# Patient Record
Sex: MALE | Race: WHITE | NOT HISPANIC OR LATINO | Employment: FULL TIME | URBAN - METROPOLITAN AREA
[De-identification: names, ages, dates, MRNs, and addresses within clinical notes are randomized per-mention and may not be internally consistent; named-entity substitution may affect disease eponyms.]

---

## 2022-08-22 ENCOUNTER — OFFICE VISIT (OUTPATIENT)
Dept: URGENT CARE | Facility: CLINIC | Age: 52
End: 2022-08-22
Payer: COMMERCIAL

## 2022-08-22 DIAGNOSIS — M25.561 ACUTE PAIN OF RIGHT KNEE: Primary | ICD-10-CM

## 2022-08-22 PROCEDURE — 99203 OFFICE O/P NEW LOW 30 MIN: CPT | Performed by: PHYSICIAN ASSISTANT

## 2022-08-22 RX ORDER — PREDNISONE 20 MG/1
20 TABLET ORAL DAILY
Qty: 7 TABLET | Refills: 0 | Status: SHIPPED | OUTPATIENT
Start: 2022-08-22 | End: 2022-08-29

## 2022-08-22 RX ORDER — IBUPROFEN 600 MG/1
600 TABLET ORAL EVERY 6 HOURS PRN
Qty: 30 TABLET | Refills: 0 | Status: SHIPPED | OUTPATIENT
Start: 2022-08-22

## 2022-08-22 NOTE — PROGRESS NOTES
[facility]        NAME: Gary Beltre is a 49 y o  male  : 1970    MRN: [id number]  DATE: 2022  TIME: 12:23 PM    Assessment and Plan   Acute pain of right knee [M25 561]  1  Acute pain of right knee  ibuprofen (MOTRIN) 600 mg tablet    predniSONE 20 mg tablet    US MSK limited     - Ddx includes Baker's cyst vs effusion vs bursitis  - Rest, ice, compression, elevation  - Ibuprofen 600 and Prednisone sent x 1 week  - Schedule US of the right knee/popliteal fossa if symptoms do not resolve  - ER for worsening pain, swelling, fevers     Patient Instructions       Follow up with PCP in 3-5 days  Proceed to  ER if symptoms worsen  Chief Complaint     Chief Complaint   Patient presents with    Knee Pain         History of Present Illness       Patient is a 48 yo male who presents with a cc of discomfort in the posterior right knee x 4 days  States he feels a swollen area behind the knee as well  No trauma or injury  Symptoms worse with knee flexion especially and also somewhat with extension  Reports he is also not able to full flex/extend the knee  No fevers, chills, lower extremity edema  Review of Systems   Review of Systems   Musculoskeletal: Positive for arthralgias  Negative for gait problem and joint swelling  Right posterior knee pain   Skin: Negative for color change  Neurological: Negative for weakness and numbness           Current Medications       Current Outpatient Medications:     ibuprofen (MOTRIN) 600 mg tablet, Take 1 tablet (600 mg total) by mouth every 6 (six) hours as needed for mild pain, Disp: 30 tablet, Rfl: 0    predniSONE 20 mg tablet, Take 1 tablet (20 mg total) by mouth daily for 7 days, Disp: 7 tablet, Rfl: 0    Current Allergies     Allergies as of 2022    (Not on File)            The following portions of the patient's history were reviewed and updated as appropriate: allergies, current medications, past family history, past medical history, past social history, past surgical history and problem list      No past medical history on file  No past surgical history on file  No family history on file  Medications have been verified  Objective   There were no vitals taken for this visit  Physical Exam     Physical Exam  Constitutional:       General: He is not in acute distress  Cardiovascular:      Rate and Rhythm: Normal rate  Pulmonary:      Effort: Pulmonary effort is normal    Musculoskeletal:      Comments: Nontender nonwarm mass felt in right popliteal fossa most prominent with knee extension  Full flexion and extension somewhat limited due to pain/discomfort  No gait abnormality  Neurovascularly intact    Skin:     General: Skin is warm and dry  Neurological:      Mental Status: He is alert     Psychiatric:         Mood and Affect: Mood normal          Behavior: Behavior normal

## 2022-09-27 ENCOUNTER — APPOINTMENT (OUTPATIENT)
Dept: RADIOLOGY | Facility: CLINIC | Age: 52
End: 2022-09-27
Payer: COMMERCIAL

## 2022-09-27 ENCOUNTER — OFFICE VISIT (OUTPATIENT)
Dept: OBGYN CLINIC | Facility: CLINIC | Age: 52
End: 2022-09-27
Payer: COMMERCIAL

## 2022-09-27 VITALS
HEART RATE: 80 BPM | SYSTOLIC BLOOD PRESSURE: 110 MMHG | HEIGHT: 74 IN | RESPIRATION RATE: 18 BRPM | BODY MASS INDEX: 31.7 KG/M2 | OXYGEN SATURATION: 98 % | WEIGHT: 247 LBS | DIASTOLIC BLOOD PRESSURE: 76 MMHG

## 2022-09-27 DIAGNOSIS — M25.561 ACUTE PAIN OF RIGHT KNEE: ICD-10-CM

## 2022-09-27 DIAGNOSIS — M17.11 PRIMARY OSTEOARTHRITIS OF RIGHT KNEE: Primary | ICD-10-CM

## 2022-09-27 DIAGNOSIS — M71.21 BAKER'S CYST OF KNEE, RIGHT: ICD-10-CM

## 2022-09-27 PROCEDURE — 99204 OFFICE O/P NEW MOD 45 MIN: CPT | Performed by: FAMILY MEDICINE

## 2022-09-27 PROCEDURE — 73564 X-RAY EXAM KNEE 4 OR MORE: CPT

## 2022-09-27 RX ORDER — CHLORAL HYDRATE 500 MG
1000 CAPSULE ORAL DAILY
COMMUNITY

## 2022-09-27 RX ORDER — MULTIVITAMIN
1 CAPSULE ORAL DAILY
COMMUNITY

## 2022-09-27 RX ORDER — TESTOSTERONE 30 MG/1.5ML
SOLUTION TOPICAL
COMMUNITY
Start: 2022-07-02

## 2022-09-27 RX ORDER — ACETAMINOPHEN/DIPHENHYDRAMINE 500MG-25MG
81 TABLET ORAL DAILY
COMMUNITY
Start: 2022-07-06

## 2022-09-27 RX ORDER — LEVOTHYROXINE SODIUM 0.15 MG/1
150 TABLET ORAL DAILY
COMMUNITY
Start: 2022-07-07

## 2022-09-27 NOTE — PROGRESS NOTES
Subjective:    Chief Complaint   Patient presents with    Right Knee - Pain, Clicking       Phil Lynn is a 49 y o  male complains of right knee baker's cyst  Onset of the symptoms was several days ago  Mechanism of injury: none  Aggravating factors: none  Treatment to date: rest and elevation  Symptoms have essentially resolved  Patient reports no pain at current visit and reports the bakers cyst has completely resolved with rest, elevation and ibuprofen  The following portions of the patient's history were reviewed and updated as appropriate: allergies, current medications, past family history, past medical history, past social history, past surgical history and problem list     Occupation:  /    Review of Systems   Constitutional: Negative for fever  HENT: Negative for dental problem and headaches  Eyes: Negative for vision loss  Respiratory: Negative for cough and shortness of breath  Cardiovascular: Negative for leg swelling and palpitations  Gastrointestinal: Negative for constipation and diarrhea  Genitourinary: Negative for bladder incontinence and difficulty urinating  Musculoskeletal: Negative for back pain and difficulty walking  Skin: Negative for rash and ulcer  Neurological: Negative for dizziness and headaches  Hem/Lymph/Immuno: Negative for blood clots  Does not bruise/bleed easily  Psychiatric/Behavioral: Negative for confusion  Objective:  /76   Pulse 80   Resp 18   Ht 6' 2" (1 88 m)   Wt 112 kg (247 lb)   SpO2 98%   BMI 31 71 kg/m²   Skin: no rashes, lesions, skin discolorations, lacerations  Vasculature: normal popliteal and pedal pulse, normal skin color, normal capillary refill in extremity, no lower extremity edema  Neurologic: Neurologic exam is normal throughout lower extremities, Awake, alert, and oriented x3, no apparent distress      Musculoskeletal: Right  KNEE EXAM  Gait: limping gait negative  Inspection:No erythema, no induration  There is no gross deformity  Palpation: Swelling: negative  Effusion: negative  Medial joint line TTP: negative  Lateral joint line TTP: negative  ROM: Full flexion and extension  Special tests: Barbara's: negative, Apley's compression: negative  Instability to varus/valgus stress: negative  Anterior Drawer: negative Lachman's test: negative  Posterior Drawer: negative  Crepitus: negative        Imaging:  See final report     Assessment/Plan:    1  Acute pain of right knee  2  Primary osteoarthritis of right knee  3  Baker's cyst of knee, right      >45min devoted to review of previous, pertinent medical records, imaging, discussion of treatment options, counseling and documentation  Imaging independently reviewed and discussed with patient  Degenerative changes noted, no acute fractures appreciated  Follow-up official reading  We discussed the nature of knee OA /Bakers cyst at length and detailed the treatment approach  Patient is asymptomatic at this time and has resolution of the Baker cyst   Discussed role for corticosteroid injection if Baker cyst returns-patient will return if symptoms recur  Follow up in as needed   Should sx's worsen or any concerns arise, they were advised to follow up sooner or seek more immediate medical attention  All of the patient's concerns were addressed and questions answered  They verbalized agreement with and understanding of the treatment plan

## 2023-01-12 ENCOUNTER — OFFICE VISIT (OUTPATIENT)
Dept: FAMILY MEDICINE CLINIC | Facility: CLINIC | Age: 53
End: 2023-01-12

## 2023-01-12 ENCOUNTER — APPOINTMENT (OUTPATIENT)
Dept: LAB | Facility: CLINIC | Age: 53
End: 2023-01-12

## 2023-01-12 VITALS
DIASTOLIC BLOOD PRESSURE: 68 MMHG | SYSTOLIC BLOOD PRESSURE: 100 MMHG | BODY MASS INDEX: 32.19 KG/M2 | HEART RATE: 93 BPM | WEIGHT: 250.8 LBS | HEIGHT: 74 IN | OXYGEN SATURATION: 96 % | TEMPERATURE: 98.1 F | RESPIRATION RATE: 18 BRPM

## 2023-01-12 DIAGNOSIS — Z11.4 ENCOUNTER FOR SCREENING FOR HIV: ICD-10-CM

## 2023-01-12 DIAGNOSIS — Z13.220 LIPID SCREENING: ICD-10-CM

## 2023-01-12 DIAGNOSIS — M77.9 INFLAMMATION AROUND JOINT: ICD-10-CM

## 2023-01-12 DIAGNOSIS — Z12.11 SCREENING FOR COLON CANCER: ICD-10-CM

## 2023-01-12 DIAGNOSIS — Z00.00 ANNUAL PHYSICAL EXAM: Primary | ICD-10-CM

## 2023-01-12 DIAGNOSIS — E23.0 HYPOPITUITARISM (HCC): ICD-10-CM

## 2023-01-12 DIAGNOSIS — E55.9 VITAMIN D DEFICIENCY: ICD-10-CM

## 2023-01-12 DIAGNOSIS — I31.39 PERICARDIAL EFFUSION: ICD-10-CM

## 2023-01-12 DIAGNOSIS — Z13.1 DIABETES MELLITUS SCREENING: ICD-10-CM

## 2023-01-12 DIAGNOSIS — E03.9 HYPOTHYROIDISM, UNSPECIFIED TYPE: ICD-10-CM

## 2023-01-12 DIAGNOSIS — E34.9 TESTOSTERONE DEFICIENCY: ICD-10-CM

## 2023-01-12 DIAGNOSIS — Z11.59 NEED FOR HEPATITIS C SCREENING TEST: ICD-10-CM

## 2023-01-12 PROBLEM — C85.90 LYMPHOMA (HCC): Status: ACTIVE | Noted: 2023-01-12

## 2023-01-12 LAB
25(OH)D3 SERPL-MCNC: 33.5 NG/ML (ref 30–100)
ALBUMIN SERPL BCP-MCNC: 4.4 G/DL (ref 3.5–5)
ALP SERPL-CCNC: 87 U/L (ref 46–116)
ALT SERPL W P-5'-P-CCNC: 58 U/L (ref 12–78)
ANION GAP SERPL CALCULATED.3IONS-SCNC: 6 MMOL/L (ref 4–13)
AST SERPL W P-5'-P-CCNC: 37 U/L (ref 5–45)
BILIRUB SERPL-MCNC: 0.53 MG/DL (ref 0.2–1)
BUN SERPL-MCNC: 18 MG/DL (ref 5–25)
CALCIUM SERPL-MCNC: 9.5 MG/DL (ref 8.3–10.1)
CHLORIDE SERPL-SCNC: 109 MMOL/L (ref 96–108)
CHOLEST SERPL-MCNC: 255 MG/DL
CO2 SERPL-SCNC: 23 MMOL/L (ref 21–32)
CREAT SERPL-MCNC: 0.98 MG/DL (ref 0.6–1.3)
ERYTHROCYTE [DISTWIDTH] IN BLOOD BY AUTOMATED COUNT: 14.2 % (ref 11.6–15.1)
EST. AVERAGE GLUCOSE BLD GHB EST-MCNC: 131 MG/DL
FERRITIN SERPL-MCNC: 481 NG/ML (ref 8–388)
GFR SERPL CREATININE-BSD FRML MDRD: 88 ML/MIN/1.73SQ M
GLUCOSE P FAST SERPL-MCNC: 139 MG/DL (ref 65–99)
HBA1C MFR BLD: 6.2 %
HCT VFR BLD AUTO: 47.1 % (ref 36.5–49.3)
HDLC SERPL-MCNC: 45 MG/DL
HGB BLD-MCNC: 15.8 G/DL (ref 12–17)
LDLC SERPL CALC-MCNC: 164 MG/DL (ref 0–100)
MCH RBC QN AUTO: 32.2 PG (ref 26.8–34.3)
MCHC RBC AUTO-ENTMCNC: 33.5 G/DL (ref 31.4–37.4)
MCV RBC AUTO: 96 FL (ref 82–98)
NONHDLC SERPL-MCNC: 210 MG/DL
PLATELET # BLD AUTO: 408 THOUSANDS/UL (ref 149–390)
PMV BLD AUTO: 9.5 FL (ref 8.9–12.7)
POTASSIUM SERPL-SCNC: 4.4 MMOL/L (ref 3.5–5.3)
PROT SERPL-MCNC: 8.1 G/DL (ref 6.4–8.4)
RBC # BLD AUTO: 4.9 MILLION/UL (ref 3.88–5.62)
SODIUM SERPL-SCNC: 138 MMOL/L (ref 135–147)
T3FREE SERPL-MCNC: 3.01 PG/ML (ref 2.3–4.2)
TRIGL SERPL-MCNC: 229 MG/DL
TSH SERPL DL<=0.05 MIU/L-ACNC: 2.08 UIU/ML (ref 0.45–4.5)
WBC # BLD AUTO: 9.32 THOUSAND/UL (ref 4.31–10.16)

## 2023-01-12 NOTE — ASSESSMENT & PLAN NOTE
Is a pleasant 56-year-old male with past medical history of lymphoma status post chemo and radiation, viral pericarditis, cataracts and hypothyroidism    Patient reports colonoscopy in November 2021, pending records    Routine blood work has been ordered, will follow-up pending results

## 2023-01-12 NOTE — ASSESSMENT & PLAN NOTE
Currently asymptomatic, patient no longer taking colchicine or on any steroids    Ibuprofen as needed

## 2023-01-12 NOTE — PATIENT INSTRUCTIONS

## 2023-01-12 NOTE — PROGRESS NOTES
ADULT ANNUAL [facility] PRIMARY CARE    NAME: Asha Pricesins  AGE: 47 y o  SEX: male  : 1970     DATE: 2023     Assessment and Plan:     Problem List Items Addressed This Visit        Endocrine    Hypothyroidism     Check TSH level, patient to continue levothyroxine 150 mcg         Relevant Orders    CBC and Platelet    TSH, 3rd generation with Free T4 reflex    T3, free    Hypopituitarism (HCC)     S/P radiation for lymphoma  On chronic testosterone therapy            Cardiovascular and Mediastinum    Pericardial effusion     Currently asymptomatic, patient no longer taking colchicine or on any steroids  Ibuprofen as needed            Other    Annual physical exam - Primary     Is a pleasant 46-year-old male with past medical history of lymphoma status post chemo and radiation, viral pericarditis, cataracts and hypothyroidism    Patient reports colonoscopy in 2021, pending records    Routine blood work has been ordered, will follow-up pending results  Other Visit Diagnoses     Screening for colon cancer        Lipid screening        Relevant Orders    Lipid panel    Diabetes mellitus screening        Relevant Orders    Hemoglobin A1C    Comprehensive metabolic panel    Inflammation around joint        Relevant Orders    Ferritin    Anti-dsDNA (Double-stranded) Ab by Ranelle Citizen method (RDL)    VALENTINE Screen w/ Reflex to Titer/Pattern    Lyme Antibody Profile with reflex to WB    Testosterone deficiency        Relevant Orders    Testosterone, free, total    Vitamin D deficiency        Relevant Orders    Vitamin D 25 hydroxy    Encounter for screening for HIV        Relevant Orders    : HIV 1/2 AB/AG w Reflex [facility] for 2 yr old and above    Need for hepatitis C screening test        Relevant Orders    Hepatitis C antibody          Immunizations and preventive care screenings were discussed with patient today   Appropriate education was printed on patient's after visit summary  Counseling:  Alcohol/drug use: discussed moderation in alcohol intake, the recommendations for healthy alcohol use, and avoidance of illicit drug use  Dental Health: discussed importance of regular tooth brushing, flossing, and dental visits  · Exercise: the importance of regular exercise/physical activity was discussed  Recommend exercise 3-5 times per week for at least 30 minutes  BMI Counseling: Body mass index is 32 2 kg/m²  The BMI is above normal  Nutrition recommendations include encouraging healthy choices of fruits and vegetables  Rationale for BMI follow-up plan is due to patient being overweight or obese  Patient ideally would like to transition back to a keto diet ending blood work  Depression Screening and Follow-up Plan: Patient was screened for depression during today's encounter  They screened negative with a PHQ-2 score of 0  Return in about 13 months (around 1/31/2024) for PreOp Clearance  Chief Complaint:     Chief Complaint   Patient presents with   • Establish Care     Patient is here today to establish care with new PCP  • Medication Refill      History of Present Illness:     Adult Annual Physical   Patient here for a comprehensive physical exam  The patient reports no problems  Diet and Physical Activity  · Diet/Nutrition: Used to be on Keto diet, would like to go back to it  · Exercise: no formal exercise  Depression Screening  PHQ-2/9 Depression Screening    Little interest or pleasure in doing things: 0 - not at all  Feeling down, depressed, or hopeless: 0 - not at all  PHQ-2 Score: 0  PHQ-2 Interpretation: Negative depression screen       General Health  · Sleep: snores loudly  · Hearing: normal - bilateral   · Vision: most recent eye exam <1 year ago and wears glasses  · Dental: regular dental visits          Health  · Symptoms include: none      · Lymphoma in 1996 s/p Chemo and Radiation , In remission in 1999  · Dad had HTN, Triple bypass, DM, ESRD  · Mom Brain Cancer, CAD with stents, DM  · Sister has Lupus  · Brothers and other sister DM    Does not drink alcohol  Previous smoker Quit 07/2010, 1-2 packs per day        Colonoscopy 11/2021- In [place]    Hx of pericarditis 3-4 years ago- Viral pericarditis  Previous [facility]      Review of Systems:     Review of Systems   Respiratory: Negative for cough and shortness of breath  Cardiovascular: Negative for chest pain and palpitations  Gastrointestinal: Negative for constipation, diarrhea, nausea and vomiting  Genitourinary: Negative for dysuria  Musculoskeletal: Positive for back pain  Neurological: Negative for dizziness and headaches        Past Medical History:     Past Medical History:   Diagnosis Date   • Cancer ([facility] )    • Diabetes mellitus ([facility] )    • Disease of thyroid gland       Past Surgical History:     Past Surgical History:   Procedure Laterality Date   • HERNIA REPAIR     • LIVER SURGERY     • SPLENECTOMY, PARTIAL     • TONSILLECTOMY        Family History:     Family History   Problem Relation Age of Onset   • Diabetes Mother    • Cancer Mother    • Heart disease Mother    • Heart disease Father    • Diabetes Father    • Cancer Father    • Lupus Sister       Social History:     Social History     Socioeconomic History   • Marital status: /Civil Union     Spouse name: None   • Number of children: None   • Years of education: None   • Highest education level: None   Occupational History   • None   Tobacco Use   • Smoking status: Former     Types: Cigarettes   • Smokeless tobacco: Never   Vaping Use   • Vaping Use: Never used   Substance and Sexual Activity   • Alcohol use: Not Currently   • Drug use: Not Currently   • Sexual activity: None   Other Topics Concern   • None   Social History Narrative   • None     Social Determinants of Health     Financial Resource Strain: Not on file   Food Insecurity: Not on file   Transportation Needs: Not on file   Physical Activity: Not on file   Stress: Not on file   Social Connections: Not on file   Intimate Partner Violence: Not on file   Housing Stability: Not on file      Current Medications:     Current Outpatient Medications   Medication Sig Dispense Refill   • ibuprofen (MOTRIN) 600 mg tablet Take 1 tablet (600 mg total) by mouth every 6 (six) hours as needed for mild pain 30 tablet 0   • levothyroxine 150 mcg tablet Take 150 mcg by mouth daily     • Multiple Vitamin (multivitamin) capsule Take 1 capsule by mouth daily     • Omega-3 Fatty Acids (fish oil) 1,000 mg Take 1,000 mg by mouth daily     • RA Aspirin EC 81 MG EC tablet Take 81 mg by mouth daily     • Testosterone 30 MG/ACT SOLN apply 1 APPLICATION topically daily       No current facility-administered medications for this visit  Allergies: Allergies   Allergen Reactions   • Other Nasal Congestion     Mustard   • Keflex [Cephalexin] Rash      Physical Exam:     /68   Pulse 93   Temp 98 1 °F (36 7 °C) (Temporal)   Resp 18   Ht 6' 2" (1 88 m)   Wt 114 kg (250 lb 12 8 oz)   SpO2 96%   BMI 32 20 kg/m²     Physical Exam  Vitals reviewed  Constitutional:       Appearance: He is obese  HENT:      Head: Normocephalic and atraumatic  Right Ear: Tympanic membrane normal       Left Ear: Tympanic membrane normal    Eyes:      Extraocular Movements: Extraocular movements intact  Cardiovascular:      Rate and Rhythm: Normal rate and regular rhythm  Heart sounds: Normal heart sounds  Pulmonary:      Effort: Pulmonary effort is normal       Breath sounds: Normal breath sounds  Musculoskeletal:      Cervical back: Neck supple  No tenderness  Lymphadenopathy:      Cervical: No cervical adenopathy  Skin:     Capillary Refill: Capillary refill takes less than 2 seconds  Neurological:      General: No focal deficit present        Mental Status: He is alert and oriented to person, place, and time     Psychiatric:         Mood and Affect: Mood normal          Behavior: Behavior normal           Jamee Bell DO  [address]

## 2023-01-13 LAB
ANA SER QL IA: NEGATIVE
B BURGDOR IGG+IGM SER-ACNC: 0.4 AI
DSDNA AB SER-ACNC: <1 IU/ML (ref 0–9)
HCV AB SER QL: NORMAL
HIV 1+2 AB+HIV1 P24 AG SERPL QL IA: NORMAL
HIV 2 AB SERPL QL IA: NORMAL
HIV1 AB SERPL QL IA: NORMAL
HIV1 P24 AG SERPL QL IA: NORMAL
TESTOST FREE SERPL-MCNC: 4.7 PG/ML (ref 7.2–24)
TESTOST SERPL-MCNC: 89 NG/DL (ref 264–916)

## 2023-01-17 ENCOUNTER — TELEPHONE (OUTPATIENT)
Dept: ENDOCRINOLOGY | Facility: CLINIC | Age: 53
End: 2023-01-17

## 2023-01-17 ENCOUNTER — OFFICE VISIT (OUTPATIENT)
Dept: FAMILY MEDICINE CLINIC | Facility: CLINIC | Age: 53
End: 2023-01-17

## 2023-01-17 VITALS
BODY MASS INDEX: 31.49 KG/M2 | OXYGEN SATURATION: 95 % | WEIGHT: 245.38 LBS | HEIGHT: 74 IN | SYSTOLIC BLOOD PRESSURE: 116 MMHG | TEMPERATURE: 97.9 F | HEART RATE: 97 BPM | DIASTOLIC BLOOD PRESSURE: 66 MMHG

## 2023-01-17 DIAGNOSIS — E23.0 HYPOPITUITARISM (HCC): ICD-10-CM

## 2023-01-17 DIAGNOSIS — E03.9 HYPOTHYROIDISM, UNSPECIFIED TYPE: ICD-10-CM

## 2023-01-17 DIAGNOSIS — R79.89 ELEVATED FERRITIN LEVEL: ICD-10-CM

## 2023-01-17 DIAGNOSIS — E34.9 TESTOSTERONE DEFICIENCY: ICD-10-CM

## 2023-01-17 DIAGNOSIS — R73.03 PREDIABETES: Primary | ICD-10-CM

## 2023-01-17 DIAGNOSIS — C81.91 HODGKIN LYMPHOMA OF LYMPH NODES OF NECK, UNSPECIFIED HODGKIN LYMPHOMA TYPE (HCC): ICD-10-CM

## 2023-01-17 PROBLEM — E11.9 TYPE 2 DIABETES MELLITUS WITHOUT COMPLICATION, WITHOUT LONG-TERM CURRENT USE OF INSULIN (HCC): Status: ACTIVE | Noted: 2023-01-17

## 2023-01-17 RX ORDER — LEVOTHYROXINE SODIUM 0.15 MG/1
150 TABLET ORAL DAILY
Qty: 90 TABLET | Refills: 3 | Status: SHIPPED | OUTPATIENT
Start: 2023-01-17

## 2023-01-17 RX ORDER — TESTOSTERONE 30 MG/1.5ML
30 SOLUTION TOPICAL DAILY
Qty: 90 ML | Refills: 3 | Status: SHIPPED | OUTPATIENT
Start: 2023-01-17

## 2023-01-17 RX ORDER — ACETAMINOPHEN/DIPHENHYDRAMINE 500MG-25MG
81 TABLET ORAL DAILY
Qty: 30 TABLET | Refills: 1 | Status: SHIPPED | OUTPATIENT
Start: 2023-01-17

## 2023-01-17 NOTE — ASSESSMENT & PLAN NOTE
Hgb A1c 6 2, patient reports taking metformin in the past but discontinued once started controlled diet

## 2023-01-17 NOTE — PROGRESS NOTES
Name: Laureen Bowling      : 1970      MRN: [id number]  Encounter Provider: Fei Dunlap DO  Encounter Date: 2023   Encounter department: [address] [place] [address]     1  Prediabetes  Assessment & Plan:  Hgb A1c 6 2, patient reports taking metformin in the past but discontinued once started controlled diet  2  Hodgkin lymphoma of lymph nodes of neck, unspecified Hodgkin lymphoma type ([facility] [address] )    3  Hypothyroidism, unspecified type  Assessment & Plan:  Continue levothyroxine 150 mcg  Referral to endocrinology has been given    Orders:  -     Ambulatory Referral to Endocrinology; Future  -     levothyroxine 150 mcg tablet; Take 1 tablet (150 mcg total) by mouth daily    4  Hypopituitarism [facility])  -     Ambulatory Referral to Endocrinology; Future    5  Testosterone deficiency  Assessment & Plan: Will refill testosterone supplementation at this time, referral has been given to endocrinology for further management  Orders:  -     Ambulatory Referral to Endocrinology; Future  -     Testosterone, free, total; Future  -     Testosterone 30 MG/ACT SOLN; Place 1 Act (30 mg total) on the skin in the morning    6  Elevated ferritin level  Assessment & Plan:  Decreased from last check 760 now to 481  Recheck levels in 6 weeks    Orders:  -     Ferritin; Future  -     RA Aspirin EC 81 MG EC tablet; Take 1 tablet (81 mg total) by mouth daily         Subjective      Presents today to review recent blood work results  She has lost 5 pounds since last visit, currently has restarted keto diet  Also reports that he has restarted testosterone supplementation  Review of Systems   Constitutional: Negative for chills and fever  Respiratory: Negative for cough and shortness of breath  Cardiovascular: Negative for chest pain and palpitations  Gastrointestinal: Negative for constipation, diarrhea, nausea and vomiting     Neurological: Negative for dizziness and headaches  Current Outpatient Medications on File Prior to Visit   Medication Sig   • Multiple Vitamin (multivitamin) capsule Take 1 capsule by mouth daily   • Omega-3 Fatty Acids (fish oil) 1,000 mg Take 1,000 mg by mouth daily   • [DISCONTINUED] levothyroxine 150 mcg tablet Take 150 mcg by mouth daily   • [DISCONTINUED] RA Aspirin EC 81 MG EC tablet Take 81 mg by mouth daily   • [DISCONTINUED] Testosterone 30 MG/ACT SOLN apply 1 APPLICATION topically daily   • ibuprofen (MOTRIN) 600 mg tablet Take 1 tablet (600 mg total) by mouth every 6 (six) hours as needed for mild pain       Objective     /66 (BP Location: Left arm, Patient Position: Sitting, Cuff Size: Large)   Pulse 97   Temp 97 9 °F (36 6 °C) (Temporal)   Ht 6' 2" (1 88 m)   Wt 111 kg (245 lb 6 oz)   SpO2 95%   BMI 31 50 kg/m²     Physical Exam  Vitals reviewed  Constitutional:       Appearance: Normal appearance  HENT:      Head: Normocephalic and atraumatic  Eyes:      Extraocular Movements: Extraocular movements intact  Cardiovascular:      Rate and Rhythm: Normal rate and regular rhythm  Heart sounds: Normal heart sounds  Pulmonary:      Effort: Pulmonary effort is normal       Breath sounds: Normal breath sounds  Neurological:      Mental Status: He is alert and oriented to person, place, and time     Psychiatric:         Mood and Affect: Mood normal          Behavior: Behavior normal        Ilya Duarte DO

## 2023-01-17 NOTE — ASSESSMENT & PLAN NOTE
Will refill testosterone supplementation at this time, referral has been given to endocrinology for further management

## 2023-02-02 ENCOUNTER — APPOINTMENT (OUTPATIENT)
Dept: LAB | Facility: CLINIC | Age: 53
End: 2023-02-02

## 2023-02-02 DIAGNOSIS — E34.9 TESTOSTERONE DEFICIENCY: ICD-10-CM

## 2023-02-02 DIAGNOSIS — R79.89 ELEVATED FERRITIN LEVEL: ICD-10-CM

## 2023-02-02 LAB — FERRITIN SERPL-MCNC: 466 NG/ML (ref 8–388)

## 2023-02-03 LAB
TESTOST FREE SERPL-MCNC: 11.9 PG/ML (ref 7.2–24)
TESTOST SERPL-MCNC: 228 NG/DL (ref 264–916)

## 2023-02-06 ENCOUNTER — OFFICE VISIT (OUTPATIENT)
Dept: FAMILY MEDICINE CLINIC | Facility: CLINIC | Age: 53
End: 2023-02-06

## 2023-02-06 VITALS
WEIGHT: 244.38 LBS | SYSTOLIC BLOOD PRESSURE: 102 MMHG | BODY MASS INDEX: 31.36 KG/M2 | OXYGEN SATURATION: 95 % | TEMPERATURE: 97.8 F | DIASTOLIC BLOOD PRESSURE: 70 MMHG | HEIGHT: 74 IN | HEART RATE: 95 BPM

## 2023-02-06 DIAGNOSIS — E34.9 TESTOSTERONE DEFICIENCY: ICD-10-CM

## 2023-02-06 DIAGNOSIS — R79.89 ELEVATED FERRITIN LEVEL: ICD-10-CM

## 2023-02-06 DIAGNOSIS — Z01.818 PREOPERATIVE CLEARANCE: Primary | ICD-10-CM

## 2023-02-06 PROBLEM — E11.9 TYPE 2 DIABETES MELLITUS WITHOUT COMPLICATION, WITHOUT LONG-TERM CURRENT USE OF INSULIN (HCC): Status: ACTIVE | Noted: 2023-02-06

## 2023-02-06 PROBLEM — E11.9 TYPE 2 DIABETES MELLITUS WITHOUT COMPLICATION, WITHOUT LONG-TERM CURRENT USE OF INSULIN (HCC): Status: RESOLVED | Noted: 2023-02-06 | Resolved: 2023-02-06

## 2023-02-06 RX ORDER — ACETAMINOPHEN/DIPHENHYDRAMINE 500MG-25MG
81 TABLET ORAL DAILY
Qty: 30 TABLET | Refills: 1 | Status: SHIPPED | OUTPATIENT
Start: 2023-02-06

## 2023-02-06 NOTE — PROGRESS NOTES
Subjective:     Amanuel Pandya is a 47 y o  male who presents to the office today for a preoperative consultation at the request of surgeon Dr Jennifer Momin who plans on performing cataract surgery with lens implant on February 28  Planned anesthesia: local  The patient has the following known anesthesia issues: None  Patients bleeding risk: no remote history of abnormal bleeding  The following portions of the patient's history were reviewed and updated as appropriate: allergies, current medications, past family history, past medical history, past social history, past surgical history and problem list     Review of Systems  Pertinent items are noted in HPI       Objective:     /70 (BP Location: Right arm, Patient Position: Sitting, Cuff Size: Large)   Pulse 95   Temp 97 8 °F (36 6 °C) (Temporal)   Ht 6' 2" (1 88 m)   Wt 111 kg (244 lb 6 oz)   SpO2 95%   BMI 31 38 kg/m²     General Appearance:    Alert, cooperative, no distress, appears stated age   Head:    Normocephalic, without obvious abnormality, atraumatic   Eyes:    PERRL, conjunctiva/corneas clear, EOM's intact, fundi     benign, both eyes        Ears:    Normal TM's and external ear canals, both ears   Nose:   Nares normal, septum midline, mucosa normal, no drainage    or sinus tenderness   Throat:   Lips, mucosa, and tongue normal; teeth and gums normal   Neck:   Supple, symmetrical, trachea midline, no adenopathy;        thyroid:  No enlargement/tenderness/nodules; no carotid    bruit or JVD   Back:     Symmetric, no curvature, ROM normal, no CVA tenderness   Lungs:     Clear to auscultation bilaterally, respirations unlabored   Chest wall:    No tenderness or deformity   Heart:    Regular rate and rhythm, S1 and S2 normal, no murmur, rub   or gallop   Abdomen:     Soft, non-tender, bowel sounds active all four quadrants,     no masses, no organomegaly   Genitalia:    Normal male without lesion, discharge or tenderness   Rectal:    Normal tone, normal prostate, no masses or tenderness;    guaiac negative stool   Extremities:   Extremities normal, atraumatic, no cyanosis or edema   Pulses:   2+ and symmetric all extremities   Skin:   Skin color, texture, turgor normal, no rashes or lesions   Lymph nodes:   Cervical, supraclavicular, and axillary nodes normal   Neurologic:   CNII-XII intact  Normal strength, sensation and reflexes       throughout       Predictors of intubation difficulty:   Morbid obesity? no   Anatomically abnormal facies?  no   Prominent incisors? no   Receding mandible? no   Short, thick neck? no   Neck range of motion: normal    Cardiographics  Not indicated    Imaging  Not indicated    Lab Review   Appointment on 02/02/2023   Component Date Value   • Ferritin 02/02/2023 466 (H)    • Testosterone, Free 02/02/2023 11 9    • TESTOSTERONE TOTAL 02/02/2023 228 (L)    Appointment on 01/12/2023   Component Date Value   • Cholesterol 01/12/2023 255 (H)    • Triglycerides 01/12/2023 229 (H)    • HDL, Direct 01/12/2023 45    • LDL Calculated 01/12/2023 164 (H)    • Non-HDL-Chol (CHOL-HDL) 01/12/2023 210    • Hemoglobin A1C 01/12/2023 6 2 (H)    • EAG 01/12/2023 131    • WBC 01/12/2023 9 32    • RBC 01/12/2023 4 90    • Hemoglobin 01/12/2023 15 8    • Hematocrit 01/12/2023 47 1    • MCV 01/12/2023 96    • MCH 01/12/2023 32 2    • MCHC 01/12/2023 33 5    • RDW 01/12/2023 14 2    • Platelets 83/19/8170 408 (H)    • MPV 01/12/2023 9 5    • Sodium 01/12/2023 138    • Potassium 01/12/2023 4 4    • Chloride 01/12/2023 109 (H)    • CO2 01/12/2023 23    • ANION GAP 01/12/2023 6    • BUN 01/12/2023 18    • Creatinine 01/12/2023 0 98    • Glucose, Fasting 01/12/2023 139 (H)    • Calcium 01/12/2023 9 5    • AST 01/12/2023 37    • ALT 01/12/2023 58    • Alkaline Phosphatase 01/12/2023 87    • Total Protein 01/12/2023 8 1    • Albumin 01/12/2023 4 4    • Total Bilirubin 01/12/2023 0 53    • eGFR 01/12/2023 88    • TSH 3RD GENERATON 01/12/2023 2 080    • Ferritin 01/12/2023 481 (H)    • Testosterone, Free 01/12/2023 4 7 (L)    • TESTOSTERONE TOTAL 01/12/2023 89 (L)    • Vit D, 25-Hydroxy 01/12/2023 33 5    • VALENTINE 01/12/2023 Negative    • Lyme Total Antibodies 01/12/2023 0 4    • T3, Free 01/12/2023 3 01    • Hepatitis C Ab 01/12/2023 Non-reactive    • HIV-1 p24 Antigen 01/12/2023 Non-Reactive    • HIV-1 Antibody 01/12/2023 Non-Reactive    • HIV-2 Antibody 01/12/2023 Non-Reactive    • HIV Ag-Ab 5th Gen 01/12/2023 Non-Reactive    • ds DNA Ab 01/12/2023 <1         Assessment:     47 y o  male with planned surgery as above  Known risk factors for perioperative complications: None      Plan:    Preoperative clearance  Patient to have cataract surgery at the end of this month on the left eye, 2 weeks later we will have the right eye done  No risk factors for surgical procedure  At this time patient is optimized for surgery of both eyes and has been instructed to stop aspirin 24 hours before and continue 24 hours after procedure        Kate Jansen, DO

## 2023-02-06 NOTE — ASSESSMENT & PLAN NOTE
Patient to have cataract surgery at the end of this month on the left eye, 2 weeks later we will have the right eye done  No risk factors for surgical procedure  At this time patient is optimized for surgery of both eyes and has been instructed to stop aspirin 24 hours before and continue 24 hours after procedure

## 2023-03-03 ENCOUNTER — APPOINTMENT (OUTPATIENT)
Dept: LAB | Facility: CLINIC | Age: 53
End: 2023-03-03

## 2023-03-03 DIAGNOSIS — R79.89 ELEVATED FERRITIN LEVEL: ICD-10-CM

## 2023-03-03 DIAGNOSIS — E34.9 TESTOSTERONE DEFICIENCY: ICD-10-CM

## 2023-03-03 LAB — FERRITIN SERPL-MCNC: 469 NG/ML (ref 8–388)

## 2023-03-06 LAB
TESTOST FREE SERPL-MCNC: 5.8 PG/ML (ref 7.2–24)
TESTOST SERPL-MCNC: 156 NG/DL (ref 264–916)

## 2023-03-07 ENCOUNTER — OFFICE VISIT (OUTPATIENT)
Dept: FAMILY MEDICINE CLINIC | Facility: CLINIC | Age: 53
End: 2023-03-07

## 2023-03-07 VITALS
TEMPERATURE: 97.7 F | WEIGHT: 237 LBS | HEIGHT: 74 IN | HEART RATE: 101 BPM | SYSTOLIC BLOOD PRESSURE: 104 MMHG | BODY MASS INDEX: 30.42 KG/M2 | OXYGEN SATURATION: 95 % | DIASTOLIC BLOOD PRESSURE: 68 MMHG

## 2023-03-07 DIAGNOSIS — E34.9 TESTOSTERONE DEFICIENCY: Primary | ICD-10-CM

## 2023-03-07 NOTE — ASSESSMENT & PLAN NOTE
Patient to hold on taking supplements at this time, has appointment with endocrinology at the end of this month

## 2023-03-07 NOTE — PROGRESS NOTES
Name: Mariposa Zimmer      : 1970      MRN: [id number]  Encounter Provider: Rajeev Wilson DO  Encounter Date: 3/7/2023   Encounter department: [address] [place] [address]  Testosterone deficiency  Assessment & Plan:  Patient to hold on taking supplements at this time, has appointment with endocrinology at the end of this month  Subjective      Patient presents today to follow-up labs  Reports that he started a new multivitamin supplement which she believes may be affecting his testosterone supplement  has also gained a couple of pounds despite being on the keto diet       Review of Systems   Constitutional: Positive for unexpected weight change  Negative for chills and fever  HENT: Positive for postnasal drip  Negative for congestion  Respiratory: Negative for cough and shortness of breath  Cardiovascular: Negative for chest pain and palpitations  Gastrointestinal: Negative for abdominal pain, constipation, diarrhea, nausea and vomiting  Neurological: Negative for dizziness and headaches  Current Outpatient Medications on File Prior to Visit   Medication Sig   • levothyroxine 150 mcg tablet Take 1 tablet (150 mcg total) by mouth daily   • Multiple Vitamin (multivitamin) capsule Take 1 capsule by mouth daily   • Omega-3 Fatty Acids (fish oil) 1,000 mg Take 1,000 mg by mouth daily   • RA Aspirin EC 81 MG EC tablet Take 1 tablet (81 mg total) by mouth daily   • Testosterone 30 MG/ACT SOLN Place 1 Act (30 mg total) on the skin in the morning       Objective     /68 (BP Location: Left arm, Patient Position: Sitting, Cuff Size: Large)   Pulse 101   Temp 97 7 °F (36 5 °C) (Temporal)   Ht 6' 2" (1 88 m)   Wt 108 kg (237 lb)   SpO2 95%   BMI 30 43 kg/m²     Physical Exam  Vitals reviewed  Constitutional:       Appearance: Normal appearance  HENT:      Head: Normocephalic and atraumatic        Right Ear: Tympanic membrane normal       Left Ear: Tympanic membrane normal       Mouth/Throat:      Mouth: Mucous membranes are moist       Pharynx: Posterior oropharyngeal erythema present  No oropharyngeal exudate  Eyes:      Extraocular Movements: Extraocular movements intact  Cardiovascular:      Rate and Rhythm: Normal rate and regular rhythm  Pulmonary:      Effort: Pulmonary effort is normal       Breath sounds: Normal breath sounds  Neurological:      General: No focal deficit present  Mental Status: He is alert and oriented to person, place, and time     Psychiatric:         Mood and Affect: Mood normal          Behavior: Behavior normal        Orie Primas, DO

## 2023-03-10 ENCOUNTER — TELEPHONE (OUTPATIENT)
Dept: OBGYN CLINIC | Facility: CLINIC | Age: 53
End: 2023-03-10

## 2023-03-10 ENCOUNTER — TELEMEDICINE (OUTPATIENT)
Dept: FAMILY MEDICINE CLINIC | Facility: CLINIC | Age: 53
End: 2023-03-10

## 2023-03-10 DIAGNOSIS — J06.9 ACUTE URI: Primary | ICD-10-CM

## 2023-03-10 RX ORDER — FLUTICASONE PROPIONATE 50 MCG
1 SPRAY, SUSPENSION (ML) NASAL DAILY
Qty: 11.1 ML | Refills: 0 | Status: SHIPPED | OUTPATIENT
Start: 2023-03-10

## 2023-03-10 RX ORDER — AZITHROMYCIN 250 MG/1
TABLET, FILM COATED ORAL
Qty: 6 TABLET | Refills: 0 | Status: SHIPPED | OUTPATIENT
Start: 2023-03-10 | End: 2023-03-15

## 2023-03-10 NOTE — PROGRESS NOTES
Virtual Regular Visit    Verification of patient location:    Patient is located in the following state in which I hold an active license [place]      Assessment/Plan:    Problem List Items Addressed This Visit        Respiratory    Acute URI - Primary     Reports sinus pain, pressure, congestion getting progressively worse, productive cough  Discussed upper respiratory infection  Patient is concerned because he has surgery scheduled for next week  Use Flonase, tea with honey, Tylenol for headache  No fever  Zithromax therapy  Relevant Medications    fluticasone (FLONASE) 50 mcg/act nasal spray    azithromycin (Zithromax) 250 mg tablet            Reason for visit is   Chief Complaint   Patient presents with   • Virtual Regular Visit        Encounter provider DONNA Medellin    Provider located at [address] [facility]  [phone]      Recent Visits  Date Type Provider Dept   03/07/23 Office Visit Fei Dunlap DO [address] [facility]   Showing recent visits within past 7 days and meeting all other requirements  Today's Visits  Date Type Provider Dept   03/10/23 Telemedicine Dharmesh Rowland, [address] [facility]   Showing today's visits and meeting all other requirements  Future Appointments  No visits were found meeting these conditions  Showing future appointments within next 150 days and meeting all other requirements       The patient was identified by name and date of birth  Laureen Rebeccasherry was informed that this is a telemedicine visit and that the visit is being conducted through the [employer]  He agrees to proceed     My office door was closed  No one else was in the room  He acknowledged consent and understanding of privacy and security of the video platform  The patient has agreed to participate and understands they can discontinue the visit at any time      Patient is aware this is a billable service  Subjective  Penny Unger is a 47 y o  male    Patient is being seen for a virtual visit with complaints of runny nose sinus pain, pressure  Feels hot and cold at times  Dates that it started Tuesday but is getting progressively worse  Patient is concerned because he has cataract surgery scheduled for next week  Past Medical History:   Diagnosis Date   • Cancer ([facility] [address] )    • Diabetes mellitus ([facility] )    • Disease of thyroid gland        Past Surgical History:   Procedure Laterality Date   • HERNIA REPAIR     • LIVER SURGERY     • SPLENECTOMY, PARTIAL     • TONSILLECTOMY         Current Outpatient Medications   Medication Sig Dispense Refill   • azithromycin (Zithromax) 250 mg tablet Take 2 tablets (500 mg total) by mouth daily for 1 day, THEN 1 tablet (250 mg total) daily for 4 days  6 tablet 0   • fluticasone (FLONASE) 50 mcg/act nasal spray 1 spray into each nostril daily 11 1 mL 0   • levothyroxine 150 mcg tablet Take 1 tablet (150 mcg total) by mouth daily 90 tablet 3   • Multiple Vitamin (multivitamin) capsule Take 1 capsule by mouth daily     • Omega-3 Fatty Acids (fish oil) 1,000 mg Take 1,000 mg by mouth daily     • RA Aspirin EC 81 MG EC tablet Take 1 tablet (81 mg total) by mouth daily 30 tablet 1   • Testosterone 30 MG/ACT SOLN Place 1 Act (30 mg total) on the skin in the morning 90 mL 3     No current facility-administered medications for this visit  Allergies   Allergen Reactions   • Allyl Isothiocyanate Other (See Comments)   • Other Nasal Congestion     Mustard   • Keflex [Cephalexin] Rash       Review of Systems   Constitutional: Positive for fatigue  HENT: Positive for congestion, sinus pressure and sinus pain  Video Exam    There were no vitals filed for this visit  Physical Exam  Constitutional:       Appearance: He is well-developed  He is ill-appearing  HENT:      Head: Normocephalic and atraumatic     Eyes:      General: Right eye: No discharge  Left eye: No discharge  Pulmonary:      Effort: Pulmonary effort is normal  No respiratory distress  Chest:      Chest wall: No tenderness  Musculoskeletal:         General: Normal range of motion  Cervical back: Normal range of motion and neck supple  Skin:     General: Skin is dry  Neurological:      Mental Status: He is alert and oriented to person, place, and time  Psychiatric:         Behavior: Behavior normal          Thought Content:  Thought content normal          Judgment: Judgment normal           I spent 15 minutes directly with the patient during this visit

## 2023-03-10 NOTE — ASSESSMENT & PLAN NOTE
Reports sinus pain, pressure, congestion getting progressively worse, productive cough  Discussed upper respiratory infection  Patient is concerned because he has surgery scheduled for next week  Use Flonase, tea with honey, Tylenol for headache  No fever  Zithromax therapy

## 2023-03-29 ENCOUNTER — CONSULT (OUTPATIENT)
Dept: ENDOCRINOLOGY | Age: 53
End: 2023-03-29

## 2023-03-29 VITALS
WEIGHT: 239.6 LBS | TEMPERATURE: 98.1 F | SYSTOLIC BLOOD PRESSURE: 112 MMHG | DIASTOLIC BLOOD PRESSURE: 62 MMHG | HEIGHT: 73 IN | HEART RATE: 80 BPM | OXYGEN SATURATION: 97 % | BODY MASS INDEX: 31.75 KG/M2

## 2023-03-29 DIAGNOSIS — E03.9 HYPOTHYROIDISM, UNSPECIFIED TYPE: ICD-10-CM

## 2023-03-29 DIAGNOSIS — E79.0 HYPERURICEMIA: ICD-10-CM

## 2023-03-29 DIAGNOSIS — E34.9 TESTOSTERONE DEFICIENCY: ICD-10-CM

## 2023-03-29 DIAGNOSIS — E23.0 HYPOPITUITARISM (HCC): ICD-10-CM

## 2023-03-29 DIAGNOSIS — R73.09 DYSGLYCEMIA: Primary | ICD-10-CM

## 2023-03-29 NOTE — PROGRESS NOTES
" Kelby Masters 47 y o  male MRN: [id number]    Encounter: [id number]      Assessment/Plan     Assessment: This is a 47y o -year-old male who is referred to us as a consult for    1-low serum Testosterone and hypothyroidism:  He was diagnosed with lymphoma in [address] [place] [address]  He was treated with chemo and radiation therapy at that time  He was labeled as being in remission in 1999  In 2009 he was diagnosed with hypothyroidism  In this time period he also been treated for pericarditis and diagnosed with low serum testosterone for which he has been tried on different form of Te supplements  In 2017 following an evaluation for increased ferritin and IS saw a hematologist for hemochromatosis  Ordered genetic testing 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 and normal liver MRI  This diagnosis was dismissed at the time  Currently he is using under arm 120 mg/d gel( after he saw his last Te level which was subtherapeutic (03/03/2023)  He does not feel well; lack of energy, low libido and feeling \"blue\"  He denies orthostatic symptoms, complexion change, headaches, GI symptoms  Knowing h/o head and neck radiation mandates ruling out hypopituitarism  If the results just show hypogonadism then may switch his type of Te supplementation; IM rather than topical  He is not interested in conception so no need to consider gonadotropins to be added to his regimen  2-last FBS of 136! And strong [address] of DM: This needs further evaluation and consider treatment aside from life style change  Plan:  IGF1/ ACTH/ fasting serum cortiosl, Te, FT4, gonadotropins  A1c, CMP  RTV in 4 weeks    CC:   Low testosterone and hypothyroidism    History of Present Illness     HPI:  See assessment    Review of Systems   Constitutional: Positive for fatigue  Negative for appetite change, diaphoresis and unexpected weight change  HENT: Negative for trouble swallowing and voice change  Eyes: Negative for visual disturbance     Respiratory: Negative " for chest tightness and shortness of breath  Cardiovascular: Negative for chest pain and palpitations  Gastrointestinal: Negative for constipation, diarrhea, nausea and vomiting  Endocrine: Negative for cold intolerance and heat intolerance  Musculoskeletal: Positive for arthralgias  Negative for myalgias and neck pain  Skin: Negative for rash  Neurological: Negative for dizziness, tremors, weakness and headaches  Psychiatric/Behavioral: The patient is not nervous/anxious          Historical Information   Past Medical History:   Diagnosis Date   • Cancer ([facility] )    • Diabetes mellitus ([facility] )    • Disease of thyroid gland      Past Surgical History:   Procedure Laterality Date   • CATARACT EXTRACTION, BILATERAL     • HERNIA REPAIR     • LIVER SURGERY     • SPLENECTOMY, PARTIAL     • TONSILLECTOMY       Social History   Social History     Substance and Sexual Activity   Alcohol Use Not Currently     Social History     Substance and Sexual Activity   Drug Use Not Currently     Social History     Tobacco Use   Smoking Status Former   • Types: Cigarettes   • Start date:    • Quit date:    • Years since quittin 2   • Passive exposure: Past   Smokeless Tobacco Never     Family History:   Family History   Problem Relation Age of Onset   • Diabetes Mother    • Cancer Mother    • Heart disease Mother    • Heart disease Father    • Diabetes Father    • Cancer Father    • Lupus Sister        Meds/Allergies   Current Outpatient Medications   Medication Sig Dispense Refill   • levothyroxine 150 mcg tablet Take 1 tablet (150 mcg total) by mouth daily 90 tablet 3   • Multiple Vitamin (multivitamin) capsule Take 1 capsule by mouth daily     • Omega-3 Fatty Acids (fish oil) 1,000 mg Take 1,000 mg by mouth daily     • RA Aspirin EC 81 MG EC tablet Take 1 tablet (81 mg total) by mouth daily 30 tablet 1   • Testosterone 30 MG/ACT SOLN Place 1 Act (30 mg total) on the skin in the morning 90 mL 3   • fluticasone "(FLONASE) 50 mcg/act nasal spray 1 spray into each nostril daily (Patient not taking: Reported on 3/29/2023) 11 1 mL 0     No current facility-administered medications for this visit  Allergies   Allergen Reactions   • Allyl Isothiocyanate Other (See Comments)   • Other Nasal Congestion     Mustard   • Keflex [Cephalexin] Rash       Objective   Vitals: Blood pressure 112/62, pulse 80, temperature 98 1 °F (36 7 °C), temperature source Temporal, height 6' 1\" (1 854 m), weight 109 kg (239 lb 9 6 oz), SpO2 97 %  Physical Exam  Constitutional:       Appearance: Normal appearance  He is not ill-appearing  Comments: Very cheerful gentleman  HENT:      Head: Normocephalic  Mouth/Throat:      Mouth: Mucous membranes are moist    Eyes:      General: No scleral icterus  Extraocular Movements: Extraocular movements intact  Neck:      Thyroid: No thyromegaly  Comments: Some surgical neck scar(+)  Cardiovascular:      Rate and Rhythm: Normal rate and regular rhythm  Heart sounds: Murmur heard  Comments: Systolic murmur at sternal border(+)  Pulmonary:      Breath sounds: Normal breath sounds  No wheezing or rales  Abdominal:      Palpations: There is no mass  Tenderness: There is no abdominal tenderness  Skin:     General: Skin is dry  Findings: No rash  Neurological:      General: No focal deficit present  Mental Status: He is oriented to person, place, and time  Cranial Nerves: No cranial nerve deficit  Psychiatric:         Behavior: Behavior normal          The history was obtained from the review of the chart, patient      Lab Results:   Lab Results   Component Value Date/Time    Potassium 4 4 01/12/2023 02:30 PM    Chloride 109 (H) 01/12/2023 02:30 PM    CO2 23 01/12/2023 02:30 PM    BUN 18 01/12/2023 02:30 PM    Creatinine 0 98 01/12/2023 02:30 PM    Glucose, Fasting 139 (H) 01/12/2023 02:30 PM    Calcium 9 5 01/12/2023 02:30 PM    eGFR 88 01/12/2023 02:30 " "PM    TSH 3RD GENERATON 2 080 01/12/2023 02:30 PM    Testosterone, Free 5 8 (L) 03/03/2023 10:35 AM    Testosterone, Free 11 9 02/02/2023 11:36 AM    Testosterone, Free 4 7 (L) 01/12/2023 02:30 PM             Imaging Studies:         I have personally reviewed pertinent reports  Portions of the record may have been created with voice recognition software  Occasional wrong word or \"sound a like\" substitutions may have occurred due to the inherent limitations of voice recognition software  Read the chart carefully and recognize, using context, where substitutions have occurred    "

## 2023-03-30 ENCOUNTER — APPOINTMENT (OUTPATIENT)
Dept: LAB | Facility: CLINIC | Age: 53
End: 2023-03-30

## 2023-03-30 DIAGNOSIS — E23.0 HYPOPITUITARISM (HCC): ICD-10-CM

## 2023-03-30 DIAGNOSIS — E03.9 HYPOTHYROIDISM, UNSPECIFIED TYPE: ICD-10-CM

## 2023-03-30 DIAGNOSIS — E34.9 TESTOSTERONE DEFICIENCY: ICD-10-CM

## 2023-03-30 LAB
CORTIS AM PEAK SERPL-MCNC: 25 UG/DL (ref 4.2–22.4)
FSH SERPL-ACNC: <0.2 MIU/ML (ref 0.7–10.8)
LH SERPL-ACNC: <0.2 MIU/ML (ref 1.2–10.6)
T4 FREE SERPL-MCNC: 1.09 NG/DL (ref 0.76–1.46)

## 2023-03-31 LAB
TESTOST FREE SERPL-MCNC: 5.4 PG/ML (ref 7.2–24)
TESTOST SERPL-MCNC: 61 NG/DL (ref 264–916)

## 2023-04-03 ENCOUNTER — APPOINTMENT (OUTPATIENT)
Dept: LAB | Facility: CLINIC | Age: 53
End: 2023-04-03

## 2023-04-03 ENCOUNTER — TELEPHONE (OUTPATIENT)
Dept: ENDOCRINOLOGY | Facility: CLINIC | Age: 53
End: 2023-04-03

## 2023-04-03 DIAGNOSIS — R73.09 DYSGLYCEMIA: ICD-10-CM

## 2023-04-03 DIAGNOSIS — R79.89 ELEVATED FERRITIN LEVEL: ICD-10-CM

## 2023-04-03 DIAGNOSIS — R79.89 ELEVATED FERRITIN LEVEL: Primary | ICD-10-CM

## 2023-04-03 LAB
BASOPHILS # BLD AUTO: 0.06 THOUSANDS/ÂΜL (ref 0–0.1)
BASOPHILS NFR BLD AUTO: 1 % (ref 0–1)
EOSINOPHIL # BLD AUTO: 0.23 THOUSAND/ÂΜL (ref 0–0.61)
EOSINOPHIL NFR BLD AUTO: 3 % (ref 0–6)
ERYTHROCYTE [DISTWIDTH] IN BLOOD BY AUTOMATED COUNT: 15.9 % (ref 11.6–15.1)
HCT VFR BLD AUTO: 44.1 % (ref 36.5–49.3)
HGB BLD-MCNC: 15.1 G/DL (ref 12–17)
IMM GRANULOCYTES # BLD AUTO: 0.04 THOUSAND/UL (ref 0–0.2)
IMM GRANULOCYTES NFR BLD AUTO: 1 % (ref 0–2)
LYMPHOCYTES # BLD AUTO: 2.69 THOUSANDS/ÂΜL (ref 0.6–4.47)
LYMPHOCYTES NFR BLD AUTO: 34 % (ref 14–44)
MCH RBC QN AUTO: 33.2 PG (ref 26.8–34.3)
MCHC RBC AUTO-ENTMCNC: 34.2 G/DL (ref 31.4–37.4)
MCV RBC AUTO: 97 FL (ref 82–98)
MONOCYTES # BLD AUTO: 0.87 THOUSAND/ÂΜL (ref 0.17–1.22)
MONOCYTES NFR BLD AUTO: 11 % (ref 4–12)
NEUTROPHILS # BLD AUTO: 4.12 THOUSANDS/ÂΜL (ref 1.85–7.62)
NEUTS SEG NFR BLD AUTO: 50 % (ref 43–75)
NRBC BLD AUTO-RTO: 0 /100 WBCS
PLATELET # BLD AUTO: 394 THOUSANDS/UL (ref 149–390)
PMV BLD AUTO: 10.3 FL (ref 8.9–12.7)
RBC # BLD AUTO: 4.55 MILLION/UL (ref 3.88–5.62)
WBC # BLD AUTO: 8.01 THOUSAND/UL (ref 4.31–10.16)

## 2023-04-03 NOTE — TELEPHONE ENCOUNTER
Call from [place] [facility], requesting order for CBC  Pt presented to have labs drawn and CBC was not ordered but was listed on AVS   Ok to add CBC order?

## 2023-04-05 DIAGNOSIS — E23.0 HYPOPITUITARISM (HCC): Primary | ICD-10-CM

## 2023-04-05 LAB — IGF-I SERPL-MCNC: 55 NG/ML (ref 74–255)

## 2023-04-06 ENCOUNTER — APPOINTMENT (OUTPATIENT)
Dept: LAB | Facility: CLINIC | Age: 53
End: 2023-04-06

## 2023-04-06 DIAGNOSIS — E23.0 HYPOPITUITARISM (HCC): ICD-10-CM

## 2023-04-06 LAB — PROLACTIN SERPL-MCNC: 12.8 NG/ML (ref 2.5–17.4)

## 2023-04-07 ENCOUNTER — APPOINTMENT (OUTPATIENT)
Dept: LAB | Facility: CLINIC | Age: 53
End: 2023-04-07

## 2023-04-07 DIAGNOSIS — E23.0 HYPOPITUITARISM (HCC): ICD-10-CM

## 2023-04-21 ENCOUNTER — APPOINTMENT (OUTPATIENT)
Dept: LAB | Facility: CLINIC | Age: 53
End: 2023-04-21

## 2023-04-21 DIAGNOSIS — E23.0 HYPOPITUITARISM (HCC): ICD-10-CM

## 2023-04-21 LAB — CORTIS AM PEAK SERPL-MCNC: 20.5 UG/DL (ref 4.2–22.4)

## 2023-04-25 LAB — ACTH PLAS-MCNC: 29.4 PG/ML (ref 7.2–63.3)

## 2023-05-03 LAB — ACTH PLAS-MCNC: NORMAL PG/ML

## 2023-05-07 ENCOUNTER — HOSPITAL ENCOUNTER (OUTPATIENT)
Dept: MRI IMAGING | Facility: HOSPITAL | Age: 53
Discharge: HOME | End: 2023-05-07

## 2023-05-07 DIAGNOSIS — E23.0 HYPOPITUITARISM (HCC): ICD-10-CM

## 2023-05-07 RX ADMIN — GADOBUTROL 10 ML: 604.72 INJECTION INTRAVENOUS at 16:12

## 2023-05-09 PROBLEM — J06.9 ACUTE URI: Status: RESOLVED | Noted: 2023-03-10 | Resolved: 2023-05-09

## 2023-05-17 DIAGNOSIS — E34.9 TESTOSTERONE DEFICIENCY: Primary | ICD-10-CM

## 2023-05-18 ENCOUNTER — APPOINTMENT (OUTPATIENT)
Dept: LAB | Facility: CLINIC | Age: 53
End: 2023-05-18

## 2023-05-18 DIAGNOSIS — E34.9 TESTOSTERONE DEFICIENCY: ICD-10-CM

## 2023-05-19 LAB
TESTOST FREE SERPL-MCNC: 9.3 PG/ML (ref 7.2–24)
TESTOST SERPL-MCNC: 207 NG/DL (ref 264–916)

## 2023-05-22 DIAGNOSIS — E34.9 TESTOSTERONE DEFICIENCY: ICD-10-CM

## 2023-05-22 RX ORDER — TESTOSTERONE 30 MG/1.5ML
120 SOLUTION TOPICAL DAILY
Qty: 90 ML | Refills: 3 | Status: SHIPPED | OUTPATIENT
Start: 2023-05-22 | End: 2023-05-31 | Stop reason: SDUPTHER

## 2023-05-31 ENCOUNTER — OFFICE VISIT (OUTPATIENT)
Dept: FAMILY MEDICINE CLINIC | Facility: CLINIC | Age: 53
End: 2023-05-31

## 2023-05-31 VITALS
SYSTOLIC BLOOD PRESSURE: 110 MMHG | OXYGEN SATURATION: 95 % | DIASTOLIC BLOOD PRESSURE: 80 MMHG | HEIGHT: 73 IN | HEART RATE: 92 BPM | BODY MASS INDEX: 33.46 KG/M2 | TEMPERATURE: 97.5 F | WEIGHT: 252.5 LBS

## 2023-05-31 DIAGNOSIS — E34.9 TESTOSTERONE DEFICIENCY: Primary | ICD-10-CM

## 2023-05-31 DIAGNOSIS — E03.9 HYPOTHYROIDISM, UNSPECIFIED TYPE: ICD-10-CM

## 2023-05-31 DIAGNOSIS — R00.2 PALPITATIONS: ICD-10-CM

## 2023-05-31 DIAGNOSIS — R79.89 ELEVATED FERRITIN LEVEL: ICD-10-CM

## 2023-05-31 DIAGNOSIS — M25.50 PAIN IN JOINT, MULTIPLE SITES: ICD-10-CM

## 2023-05-31 RX ORDER — TESTOSTERONE 30 MG/1.5ML
120 SOLUTION TOPICAL DAILY
Qty: 90 ML | Refills: 3 | Status: SHIPPED | OUTPATIENT
Start: 2023-05-31 | End: 2023-06-30

## 2023-05-31 RX ORDER — LACTOBACILLUS ACIDOPHILUS 500MM CELL
CAPSULE ORAL
COMMUNITY
Start: 2010-01-01

## 2023-05-31 RX ORDER — MULTIVIT-MIN/IRON/FOLIC ACID/K 18-600-40
CAPSULE ORAL
COMMUNITY
Start: 2010-01-01

## 2023-05-31 RX ORDER — BORON CITRATE 3 MG
TABLET ORAL
COMMUNITY
Start: 2020-08-01

## 2023-05-31 RX ORDER — ACETAMINOPHEN/DIPHENHYDRAMINE 500MG-25MG
81 TABLET ORAL DAILY
Qty: 90 TABLET | Refills: 1 | Status: SHIPPED | OUTPATIENT
Start: 2023-05-31

## 2023-05-31 RX ORDER — PARSLEY 450 MG
CAPSULE ORAL
COMMUNITY
Start: 2010-01-01

## 2023-05-31 NOTE — PROGRESS NOTES
Name: Alber Levy      : 1970      MRN: [id number]  Encounter Provider: Magdy Thompson DO  Encounter Date: 2023   Encounter department: [address] [place] [address]  Testosterone deficiency  Assessment & Plan:  Continuing on Topical Testosterone supplements  Following with Endocrinology and will be going for 2/2 opinion     Will also explore Rheumatology input as patient does have strong family hx of autoimmune disease- Lupus    Orders:  -     Ambulatory Referral to Endocrinology; Future  -     Testosterone 30 MG/ACT SOLN; Place 4 Act (120 mg total) on the skin in the morning    2  Elevated ferritin level  -     RA Aspirin EC 81 MG EC tablet; Take 1 tablet (81 mg total) by mouth daily    3  Pain in joint, multiple sites  -     Ambulatory Referral to Rheumatology; Future    4  Palpitations  Assessment & Plan: Will check Holter Monitor and ECHO  Most recent TSH wnl    Orders:  -     Holter monitor; Future; Expected date: 2023  -     Echo complete w/ contrast if indicated; Future; Expected date: 2023    5  Hypothyroidism, unspecified type  Assessment & Plan:  Continue Levothyroxine 150mcg daily  Subjective      Patient presents today for routine f/u  States that he still feels very sluggish and having no energy, also reports that he has been gaining weight despite continuing on testosterone supplements  Review of Systems   Constitutional: Positive for fatigue  Negative for chills and fever  Weight Gain    HENT: Negative for ear pain and sore throat  Eyes: Negative for pain and visual disturbance  Respiratory: Negative for cough and shortness of breath  Cardiovascular: Negative for chest pain and palpitations  Gastrointestinal: Negative for abdominal pain and vomiting  Genitourinary: Negative for dysuria and hematuria  Musculoskeletal: Negative for arthralgias and back pain     Skin: Negative for color change "and rash  Neurological: Negative for dizziness, seizures and syncope  All other systems reviewed and are negative  Current Outpatient Medications on File Prior to Visit   Medication Sig   • ACAI BERRY PO 1,000 mg in the morning   • Acidophilus Lactobacillus CAPS    • Ascorbic Acid (Vitamin C) 500 MG CAPS    • Ashwagandha 500 MG CAPS    • B Complex Vitamins (B COMPLEX 100 PO)    • Boron 3 MG CAPS    • Cholecalciferol (Vitamin D3) 125 MCG (5000 UT) CAPS    • Chromium Picolinate 500 MCG CAPS    • Coenzyme Q10 (CoQ10) 400 MG CAPS    • Garlic 9857 MG CAPS    • levothyroxine 150 mcg tablet Take 1 tablet (150 mcg total) by mouth daily   • Magnesium 400 MG CAPS    • Manganese 10 MG TABS    • Multiple Vitamin (multivitamin) capsule Take 1 capsule by mouth daily   • Omega-3 Fatty Acids (fish oil) 1,000 mg Take 1,000 mg by mouth daily   • Potassium Citrate,Elemental K, 99 MG CAPS    • Saw Palmetto, Serenoa repens, (Saw Quinton Berries) 540 MG CAPS    • TURMERIC  mg in the morning   • [DISCONTINUED] RA Aspirin EC 81 MG EC tablet Take 1 tablet (81 mg total) by mouth daily   • [DISCONTINUED] Testosterone 30 MG/ACT SOLN Place 4 Act (120 mg total) on the skin in the morning   • [DISCONTINUED] fluticasone (FLONASE) 50 mcg/act nasal spray 1 spray into each nostril daily       Objective     /80 (BP Location: Left arm, Patient Position: Sitting, Cuff Size: Large)   Pulse 92   Temp 97 5 °F (36 4 °C) (Temporal)   Ht 6' 1\" (1 854 m)   Wt 115 kg (252 lb 8 oz)   SpO2 95%   BMI 33 31 kg/m²     Physical Exam  Vitals reviewed  Constitutional:       Appearance: He is obese  HENT:      Head: Normocephalic and atraumatic  Eyes:      Extraocular Movements: Extraocular movements intact  Cardiovascular:      Rate and Rhythm: Normal rate and regular rhythm  Pulses: Normal pulses  Heart sounds: Murmur heard  Pulmonary:      Effort: Pulmonary effort is normal       Breath sounds: Normal breath sounds   " Neurological:      General: No focal deficit present  Mental Status: He is alert and oriented to person, place, and time     Psychiatric:         Mood and Affect: Mood normal          Behavior: Behavior normal           Anderson Carrillo DO

## 2023-05-31 NOTE — ASSESSMENT & PLAN NOTE
Continuing on Topical Testosterone supplements  Following with Endocrinology and will be going for 2/2 opinion     Will also explore Rheumatology input as patient does have strong family hx of autoimmune disease- Lupus

## 2023-06-01 ENCOUNTER — TELEPHONE (OUTPATIENT)
Dept: OBGYN CLINIC | Facility: HOSPITAL | Age: 53
End: 2023-06-01

## 2023-06-13 ENCOUNTER — HOSPITAL ENCOUNTER (OUTPATIENT)
Dept: NON INVASIVE DIAGNOSTICS | Facility: CLINIC | Age: 53
Discharge: HOME | End: 2023-06-13
Payer: COMMERCIAL

## 2023-06-13 VITALS
DIASTOLIC BLOOD PRESSURE: 80 MMHG | HEART RATE: 110 BPM | SYSTOLIC BLOOD PRESSURE: 110 MMHG | BODY MASS INDEX: 33.4 KG/M2 | HEIGHT: 73 IN | WEIGHT: 252 LBS

## 2023-06-13 DIAGNOSIS — R00.2 PALPITATIONS: ICD-10-CM

## 2023-06-13 LAB
ALBUMIN SERPL-MCNC: 4.9 G/DL (ref 3.8–4.9)
ALBUMIN/GLOB SERPL: 2 {RATIO} (ref 1.2–2.2)
ALP SERPL-CCNC: 102 IU/L (ref 44–121)
ALT SERPL-CCNC: 66 IU/L (ref 0–44)
AORTIC ROOT: 3.3 CM
APICAL FOUR CHAMBER EJECTION FRACTION: 55 %
ASCENDING AORTA: 3.5 CM
AST SERPL-CCNC: 39 IU/L (ref 0–40)
AV REGURGITATION PRESSURE HALF TIME: 425 MS
BILIRUB SERPL-MCNC: 0.4 MG/DL (ref 0–1.2)
BUN SERPL-MCNC: 11 MG/DL (ref 6–24)
BUN/CREAT SERPL: 14 (ref 9–20)
CALCIUM SERPL-MCNC: 9.8 MG/DL (ref 8.7–10.2)
CHLORIDE SERPL-SCNC: 98 MMOL/L (ref 96–106)
CO2 SERPL-SCNC: 23 MMOL/L (ref 20–29)
CREAT SERPL-MCNC: 0.78 MG/DL (ref 0.76–1.27)
E WAVE DECELERATION TIME: 233 MS
EGFR: 108 ML/MIN/1.73
FRACTIONAL SHORTENING: 32 % (ref 28–44)
GLOBULIN SER-MCNC: 2.5 G/DL (ref 1.5–4.5)
GLUCOSE SERPL-MCNC: 380 MG/DL (ref 70–99)
HBA1C MFR BLD: 9.7 % (ref 4.8–5.6)
INTERVENTRICULAR SEPTUM IN DIASTOLE (PARASTERNAL SHORT AXIS VIEW): 1 CM
INTERVENTRICULAR SEPTUM: 1 CM (ref 0.6–1.1)
LAAS-AP2: 14 CM2
LAAS-AP4: 16.1 CM2
LEFT ATRIUM SIZE: 3.6 CM
LEFT INTERNAL DIMENSION IN SYSTOLE: 2.8 CM (ref 2.1–4)
LEFT VENTRICLE DIASTOLIC VOLUME (MOD BIPLANE): 78 ML
LEFT VENTRICLE SYSTOLIC VOLUME (MOD BIPLANE): 34 ML
LEFT VENTRICULAR INTERNAL DIMENSION IN DIASTOLE: 4.1 CM (ref 3.5–6)
LEFT VENTRICULAR POSTERIOR WALL IN END DIASTOLE: 1 CM
LEFT VENTRICULAR STROKE VOLUME: 45 ML
LV EF: 56 %
LVSV (TEICH): 45 ML
MV E'TISSUE VEL-SEP: 7 CM/S
MV PEAK A VEL: 1.27 M/S
MV PEAK E VEL: 95 CM/S
MV STENOSIS PRESSURE HALF TIME: 68 MS
MV VALVE AREA P 1/2 METHOD: 3.24 CM2
POTASSIUM SERPL-SCNC: 4.5 MMOL/L (ref 3.5–5.2)
PROT SERPL-MCNC: 7.4 G/DL (ref 6–8.5)
RIGHT ATRIUM AREA SYSTOLE A4C: 12.4 CM2
RIGHT VENTRICLE ID DIMENSION: 3.7 CM
SL CV AV DECELERATION TIME RETROGRADE: 1464 MS
SL CV AV PEAK GRADIENT RETROGRADE: 87 MMHG
SL CV LEFT ATRIUM LENGTH A2C: 4.5 CM
SL CV LV EF: 55
SL CV PED ECHO LEFT VENTRICLE DIASTOLIC VOLUME (MOD BIPLANE) 2D: 76 ML
SL CV PED ECHO LEFT VENTRICLE SYSTOLIC VOLUME (MOD BIPLANE) 2D: 30 ML
SODIUM SERPL-SCNC: 139 MMOL/L (ref 134–144)
TR MAX PG: 25 MMHG
TR PEAK VELOCITY: 2.5 M/S
TRICUSPID ANNULAR PLANE SYSTOLIC EXCURSION: 1.6 CM
TRICUSPID VALVE PEAK REGURGITATION VELOCITY: 2.49 M/S

## 2023-06-13 PROCEDURE — 93306 TTE W/DOPPLER COMPLETE: CPT

## 2023-06-13 PROCEDURE — 93306 TTE W/DOPPLER COMPLETE: CPT | Performed by: INTERNAL MEDICINE

## 2023-06-15 ENCOUNTER — HOSPITAL ENCOUNTER (OUTPATIENT)
Dept: NON INVASIVE DIAGNOSTICS | Facility: CLINIC | Age: 53
Discharge: HOME | End: 2023-06-15
Payer: COMMERCIAL

## 2023-06-15 DIAGNOSIS — R00.2 PALPITATIONS: ICD-10-CM

## 2023-06-15 PROCEDURE — 93226 XTRNL ECG REC<48 HR SCAN A/R: CPT

## 2023-06-15 PROCEDURE — 93225 XTRNL ECG REC<48 HRS REC: CPT

## 2023-06-16 ENCOUNTER — OFFICE VISIT (OUTPATIENT)
Dept: ENDOCRINOLOGY | Facility: HOSPITAL | Age: 53
End: 2023-06-16
Payer: COMMERCIAL

## 2023-06-16 ENCOUNTER — TELEPHONE (OUTPATIENT)
Dept: ENDOCRINOLOGY | Facility: HOSPITAL | Age: 53
End: 2023-06-16

## 2023-06-16 VITALS
WEIGHT: 251 LBS | HEIGHT: 73 IN | BODY MASS INDEX: 33.27 KG/M2 | HEART RATE: 84 BPM | DIASTOLIC BLOOD PRESSURE: 78 MMHG | SYSTOLIC BLOOD PRESSURE: 120 MMHG

## 2023-06-16 DIAGNOSIS — E23.0 HYPOPITUITARISM (HCC): ICD-10-CM

## 2023-06-16 DIAGNOSIS — E89.0 POSTABLATIVE HYPOTHYROIDISM: ICD-10-CM

## 2023-06-16 DIAGNOSIS — E34.9 TESTOSTERONE DEFICIENCY: ICD-10-CM

## 2023-06-16 DIAGNOSIS — E78.2 MIXED HYPERLIPIDEMIA: ICD-10-CM

## 2023-06-16 DIAGNOSIS — E11.65 TYPE 2 DIABETES MELLITUS WITH HYPERGLYCEMIA, WITHOUT LONG-TERM CURRENT USE OF INSULIN (HCC): Primary | ICD-10-CM

## 2023-06-16 PROBLEM — R73.03 PREDIABETES: Status: RESOLVED | Noted: 2023-01-17 | Resolved: 2023-06-16

## 2023-06-16 PROCEDURE — 99215 OFFICE O/P EST HI 40 MIN: CPT | Performed by: STUDENT IN AN ORGANIZED HEALTH CARE EDUCATION/TRAINING PROGRAM

## 2023-06-16 RX ORDER — TESTOSTERONE CYPIONATE 200 MG/ML
200 VIAL (ML) INTRAMUSCULAR
Qty: 10 ML | Refills: 1 | Status: SHIPPED | OUTPATIENT
Start: 2023-06-16

## 2023-06-16 RX ORDER — BLOOD-GLUCOSE METER
EACH MISCELLANEOUS DAILY
Qty: 1 KIT | Refills: 0 | Status: SHIPPED | OUTPATIENT
Start: 2023-06-16

## 2023-06-16 RX ORDER — LANCETS
EACH MISCELLANEOUS
Qty: 200 EACH | Refills: 1 | Status: SHIPPED | OUTPATIENT
Start: 2023-06-16

## 2023-06-16 RX ORDER — BLOOD SUGAR DIAGNOSTIC
STRIP MISCELLANEOUS
Qty: 200 EACH | Refills: 1 | Status: SHIPPED | OUTPATIENT
Start: 2023-06-16

## 2023-06-16 RX ORDER — SYRINGE W-NEEDLE,DISPOSAB,3 ML 25GX5/8"
SYRINGE, EMPTY DISPOSABLE MISCELLANEOUS
Qty: 50 EACH | Refills: 1 | Status: SHIPPED | OUTPATIENT
Start: 2023-06-16

## 2023-06-16 RX ORDER — TESTOSTERONE CYPIONATE 200 MG/ML
200 VIAL (ML) INTRAMUSCULAR
Qty: 10 ML | Refills: 1 | Status: SHIPPED | OUTPATIENT
Start: 2023-06-16 | End: 2023-06-16 | Stop reason: SDUPTHER

## 2023-06-16 NOTE — TELEPHONE ENCOUNTER
The pharmacist from [facility] called for clarification on this patients testosterone prescription  She mentioned that the directions state to inject 200 mcg every 14 days however the dosage is 200 mg/ ML  They will need an updated script sent in  She also noted that they will need a prescription for syringes and needles

## 2023-06-16 NOTE — PROGRESS NOTES
"6/16/2023    Assessment/Plan        Problem List Items Addressed This Visit        Endocrine    Hypothyroidism    Hypopituitarism ([facility] [address] )    Relevant Orders    T4, free    Insulin-like growth factor 1 (IGF-1) - Lab Collect    Growth hormone    Type 2 diabetes mellitus with hyperglycemia, without long-term current use of insulin (HCC) - Primary    Relevant Medications    Lancets (onetouch ultrasoft) lancets    Blood Glucose Monitoring Suppl (OneTouch Verio) w/Device KIT    glucose blood (OneTouch Verio) test strip    metFORMIN (GLUCOPHAGE) 500 mg tablet    Other Relevant Orders    CT abdomen w wo contrast    Hemoglobin A1C    Comprehensive metabolic panel    Albumin / creatinine urine ratio    Lipid panel    C-peptide Lab Collect    Glutamic acid decarboxylase Lab Collect    Zinc Transporter 8 (Znt8) Antibody    IA-2 ANTIBODY       Other    Testosterone deficiency    Relevant Medications    Testosterone Cypionate 200 MG/ML SOLN    Syringe/Needle, Disp, (SYRINGE 3CC/25GX1\") 25G X 1\" 3 ML MISC    SYRINGE-NEEDLE, DISP, 5 ML 22G X 1\" 5 ML MISC    Other Relevant Orders    Testosterone, free, total    PSA, total screen Lab Collect    CBC and differential- Lab Collect    Mixed hyperlipidemia       Assessment/Plan:  Patient is a 51yM with PMHx of hypothyroidism and secondary hypogonadism unclear etiology- ?radiation induced while tx of lymphoma, with other PMHx of t2DM, hyperlipidemia, class 2 obesity who presents today for follow up     1) ? Panhypopituitarism:- patient had MRI brain 03/23 was neg, no genetic workup  Patient does have hypothyroidism and hypogonadism however cortisol in past was normal  With ACTH and cortisol in AM 29 4 and 20 5 respectively 04/23  Hypothyroidism- given concern for secondary hypothyroidism- only needs to check Free t4, however since unsure if truly has secondary hypothyroidism will check TSH and Free t4, if secondary- TSH should be low   For now c/w levothyroxine 150mcg and repeat TSH and " Free t4 now  Discussed symptoms reported by patient appears to be hypothyroid related rather than hypogonadism  But will check labs to confirm     Hypogonadism- given low LH/FSH concerned about secondary hypogonadism, most likely d/t iatrogenic testosterone use  Unfortunately since on T replacement for long time now, has secondary hypogonadism  MRI pit neg, genetic issues cannot be ruled out  Discussed symptoms reported doesn't sound to be hypogonadism related, however given low total testosterone and concern for absorption  Can switch to testosterone cypionate 200mg every 2 weeks  Check Testosterone, CBC and PSA every 3 months initially  Discussed need to check Testosterone level at peak- 1 week after injection  Also reviewed peak and tough effect of T therapy via IM/SubQ  Will repeat IGF/GH again for full workup  If positive will need to consider macromelin or glucagon stim testing  2) Diabetes:- Patients HbA1C is elevated at 9 7%, does have symptoms of tiredness which I discussed could be because of hyperglycemia  However given rapid decline in glycemic control will rule out FELIX and also get CT abdomen to look at his pancrease  Discussed needs to start checking BG 2-3x daily and get me results soon to decide on if insulin therapy needed or not  Would also want to see pancreatic morphology to then safely consider GLP-1 agonist/not  For now start metformin 500mg daily  He would also like to try diet changes with keto diet to help with weight loss and control diabetes down again  Discussed risk of hypertriglyceridemia on keto  Will check urine, Lipid, HbA1C and CMP in 3 months   Eye exam- he follows regularly  3) Hyperlipidemia:- patient currently not on any medication, will repeat and if elevated consider lipitor  Of note patient on plenty of supplementations- will review these next visit to discuss if any of these cause of elevated ferritin?     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 who was then diagnosed with hypothyroidism in 2009 on levothyroxine and also hypogonadism on TD testosterone replacement  Other PMHx of ?hemachromatosis vs elevated ferritin d/t unclear etiology with other PMHx of T2DM, hyperlipidemia and class 1 obesity who previously used to see Dr Truman Brown for hormonal replacement  Here for second opinion  All previous labs, history reviewed for the visit  Patient reports he has been struggling with feeling very tired and having issues with temperature imbalance and also myalgia, swelling of joints and cold intolerance for few months now  Reports previously was doing keto diet and lost weight down to 209lbs and was able to control his diabetes better with diet/exercise  Was briefly on metformin but off this since diet/exercise changes  However recently not following keto diet  Patient reports he is also worried about his ferritin level which per him goes up when testosterone levels are high and improves when testosterone level low  Saw GI in past and had MRI to test for hemochromatosis and was neg  Hypogonadism:- always been on TD- patches caused abnormal hair loss in arms, currently on gel 4 pumps daily and reports having mood issues on this with anger, also reports having issues with cold intolerance, myalgia, low energy and temperature imbalance  Most recent Testosterone level 05/23 207 total and free 9 3  LH <0 2 and [address] <0 2  given this MRI brain done and was neg for any pituitary pathology  Prolactin 12 8  Hypothyroidism:- patient currently on 150mcg levothyroxine daily, most recent Free t403/23 1 09  See above for other symptoms     Diabetes:- patient reports previously diabetes was under control  Used to check BG at home-  every 2-3 weeks and was in 80's  Previously on metformin, reports was on keto diet and lost weight and diabetes was under control  However in last few months stopped diet, gained weight upto 251lbs and is surprised his HbA1C increased from 6 2% to 9 7%  Patient currently not checking BG and not on any medications  Hyperlipidemia:- not currently on any medication, reports has had issues with elevated LDL all his life  Reports TG previously stable when on keto diet? Most recent lipid 01/23  with   Family hx and social hx as below       Review of Systems   Constitutional: Positive for fatigue and unexpected weight change  Negative for diaphoresis  Respiratory: Negative for shortness of breath  Cardiovascular: Negative for chest pain and palpitations  Gastrointestinal: Negative for constipation and diarrhea  Endocrine: Positive for cold intolerance  Negative for polydipsia and polyuria  Musculoskeletal: Positive for arthralgias and myalgias  Neurological: Negative  Psychiatric/Behavioral: Negative          Historical Information   Past Medical History:   Diagnosis Date   • Allergic Seasonal   • Cancer ([facility] [address] )    • Diabetes mellitus ([facility] [address] )    • Disease of thyroid gland      Past Surgical History:   Procedure Laterality Date   • CATARACT EXTRACTION, BILATERAL     • EYE SURGERY  02/28/23 03/14/23    Cataract   • HERNIA REPAIR     • LIVER SURGERY     • LYMPH NODE BIOPSY  1996   • SPLENECTOMY, PARTIAL     • TONSILLECTOMY       Social History   Social History     Substance and Sexual Activity   Alcohol Use Not Currently     Social History     Substance and Sexual Activity   Drug Use Not Currently     Social History     Tobacco Use   Smoking Status Former   • Packs/day: 2 00   • Years: 25 00   • Total pack years: 50 00   • Types: Cigarettes   • Start date: 1984   • Quit date: 7/15/2010   • Years since quittin 9   • Passive exposure: Past   Smokeless Tobacco Never     Family History:   Family History   Problem Relation Age of Onset   • Diabetes Mother    • Cancer Mother    • Heart disease Mother    • Coronary artery disease Mother    • COPD Mother    • Arthritis Mother    • Diabetes type II Mother    • Heart disease Father    • Diabetes Father    • Cancer Father    • Coronary artery disease Father    • COPD Father    • Arthritis Father    • Diabetes type II Father    • Hypertension Father    • Hyperlipidemia Father    • Lupus Sister    • Autoimmune disease Sister         Lupus   • Cancer Sister         Melanoma   • Vision loss Maternal Grandfather    • Prostate cancer Paternal Grandfather    • Hearing loss Sister    • Diabetes type II Sister    • Bipolar disorder Brother    • Diabetes type II Brother    • Hypertension Brother        Meds/Allergies   Current Outpatient Medications   Medication Sig Dispense Refill   • ACAI BERRY PO 1,000 mg in the morning     • Acidophilus Lactobacillus CAPS      • Ascorbic Acid (Vitamin C) 500 MG CAPS      • Ashwagandha 500 MG CAPS      • B Complex Vitamins (B COMPLEX 100 PO)      • Blood Glucose Monitoring Suppl (OneTouch Verio) w/Device KIT Use in the morning 1 kit 0   • Brutus 3 MG CAPS      • Cholecalciferol (Vitamin D3) 125 MCG (5000 UT) CAPS      • Chromium Picolinate 500 MCG CAPS      • Coenzyme Q10 (CoQ10) 400 MG CAPS      • Garlic 9627 MG CAPS      • glucose blood (OneTouch Verio) test strip Use as instructed- 2x daily 200 each 1   • Lancets (onetouch ultrasoft) lancets Use as instructed- 2x day 200 each 1   • levothyroxine 150 mcg tablet Take 1 tablet (150 mcg total) by mouth daily 90 tablet 3   • Magnesium 400 MG CAPS      • Manganese 10 MG TABS      • metFORMIN (GLUCOPHAGE) 500 mg tablet Take 1 tablet (500 mg "total) by mouth daily with dinner 90 tablet 1   • Multiple Vitamin (multivitamin) capsule Take 1 capsule by mouth daily     • Omega-3 Fatty Acids (fish oil) 1,000 mg Take 1,000 mg by mouth daily     • Potassium Citrate,Elemental K, 99 MG CAPS      • RA Aspirin EC 81 MG EC tablet Take 1 tablet (81 mg total) by mouth daily 90 tablet 1   • Saw Palmetto, Serenoa repens, (Saw [place] Berries) 540 MG CAPS      • SYRINGE-NEEDLE, DISP, 5 ML 22G X 1\" 5 ML MISC Use every 14 (fourteen) days 50 each 1   • Syringe/Needle, Disp, (SYRINGE 3CC/25GX1\") 25G X 1\" 3 ML MISC Use every 14 (fourteen) days 50 each 1   • Testosterone Cypionate 200 MG/ML SOLN Inject 200 mg as directed every 14 (fourteen) days 10 mL 1   • TURMERIC  mg in the morning     • vitamin A 2400 MCG (8000 UT) capsule      • Vitamin K 200 MCG/0 2ML LIQD      • Zinc Glycinate 20 MG CAPS        No current facility-administered medications for this visit  Allergies   Allergen Reactions   • Allyl Isothiocyanate Other (See Comments)   • Other Nasal Congestion     Mustard   • Keflex [Cephalexin] Rash       Objective   Vitals: Blood pressure 120/78, pulse 84, height 6' 1\" (1 854 m), weight 114 kg (251 lb)  Invasive Devices     None               Body mass index is 33 12 kg/m²    /78   Pulse 84   Ht 6' 1\" (1 854 m)   Wt 114 kg (251 lb)   BMI 33 12 kg/m²    Wt Readings from Last 3 Encounters:   06/16/23 114 kg (251 lb)   06/13/23 114 kg (252 lb)   05/31/23 115 kg (252 lb 8 oz)       GEN: NAD  E/n/m nl facies, hearing intact bilat, tongue midline, lips nl  Eyes: no stare or proptosis, nl lids and conjunctiva, EOMI  Neck: trachea midline, thyroid NT to palpation, nl in size, no nodules or neck masses noted, no cervical LAD  CV; heart reg rate s1s2 nl, no m/r/g appreciated, no LINDA  Resp: CTAB, good effort  Ab+BS  Neuro: no tremor, 2+ DTRs in BUE  MS: no c/c in digits, moves all 4 ext, nl muscle bulk, gait nl  Skin: warm and dry, no palmar erythema  Ext: no c/c " in digits, no edema bilaterally, 2+ DP and PT pulses bilat, no breaks in skin/ulcers on feet, sensation intact to monofilament testing on plantar surfaces bilat  Psych: nl mood and affect, no gross lapses in memory    Physical Exam  Constitutional:       Appearance: Normal appearance  He is obese  Cardiovascular:      Rate and Rhythm: Normal rate  Heart sounds: Murmur heard  Pulmonary:      Effort: Pulmonary effort is normal    Abdominal:      General: Abdomen is flat  Bowel sounds are normal       Palpations: Abdomen is soft  Musculoskeletal:      Cervical back: Normal range of motion  Skin:     General: Skin is warm  Capillary Refill: Capillary refill takes less than 2 seconds  Neurological:      General: No focal deficit present  Mental Status: He is alert  Psychiatric:         Mood and Affect: Mood normal          The history was obtained from the review of the chart and from the patient      Lab Results:       Latest Reference Range & Units 01/12/23 14:30   Cholesterol See Comment mg/dL 255 (H)   Triglycerides See Comment mg/dL 229 (H)   HDL >=40 mg/dL 45   Non-HDL Cholesterol mg/dl 210   LDL Calculated 0 - 100 mg/dL 164 (H)   (H): Data is abnormally high    Component 06/06/2022 06/06/2022 11/22/2021 11/22/2021 04/14/2021 04/14/2021            Direct Low Density Lipoprotein (d-LDL) 186 High      -- 198 High      -- 171 High      --   Cholesterol -- 240 High     -- 244 High     -- 206 High       Triglycerides -- 116 -- 145 -- 78   HDL -- 39 Low     -- 50 -- 41   LDL Calculated -- 178 High     -- 165 High     -- 149 High       Cholesterol:HDL Ratio -- 6 2 High     -- 4 9          Latest Reference Range & Units 01/12/23 14:30 06/12/23 08:29   Hemoglobin A1C 4 8 - 5 6 % 6 2 (H) 9 7 (H)   eAG, EST AVG Glucose mg/dl 131    (H): Data is abnormally high     Latest Reference Range & Units 04/07/23 08:20 04/21/23 08:40   ACTH 7 2 - 63 3 pg/mL See written report from [facility] 4   Cortisol - AM 4 2 - 22 4 ug/dL  20 5      Latest Reference Range & Units 03/30/23 08:39 04/06/23 07:52   INSULIN-LIKE GROWTH FACTOR-1 74 - 255 ng/mL 55 (L)    LUTEINIZING HORMONE 1 2 - 10 6 mIU/mL <0 2 (L)    FSH, POC 0 7 - 10 8 mIU/mL <0 2 (L)    PROLACTIN 2 5 - 17 4 ng/mL  12 8   (L): Data is abnormally low     Latest Reference Range & Units 01/12/23 14:30 03/30/23 08:39   TSH 3RD GENERATON 0 450 - 4 500 uIU/mL 2 080    Free T4 0 76 - 1 46 ng/dL  1 09   T3, Free 2 30 - 4 20 pg/mL 3 01       Latest Reference Range & Units 01/12/23 14:30 02/02/23 11:36 03/03/23 10:35 03/30/23 08:39 05/18/23 08:15   Testosterone, Total, LC/ - 916 ng/dL 89 (L) 228 (L) 156 (L) 61 (L) 207 (L)   TESTOSTERONE FREE 7 2 - 24 0 pg/mL 4 7 (L) 11 9 5 8 (L) 5 4 (L) 9 3   (L): Data is abnormally low    MRI BRAIN AND SELLA  WITH AND WITHOUT CONTRAST     INDICATION:  E23 0: Hypopituitarism     COMPARISON: None      TECHNIQUE:  Multiplanar, multisequence imaging of the brain and sella was performed before and after gadolinium administration      Targeted images of the sella were performed requiring additional time at acquisition and interpretation of approximately 25%     IV Contrast:  10 mL of Gadobutrol injection (SINGLE-DOSE)     IMAGE QUALITY:  Diagnostic      FINDINGS:     BRAIN PARENCHYMA:  There is no discrete mass, mass effect or midline shift  Brainstem and cerebellum demonstrate normal signal  There is no intracranial hemorrhage  There is no evidence of acute infarction and diffusion imaging is unremarkable  There   are no white matter changes in the cerebral hemispheres  Normal postcontrast imaging      VENTRICLES:  Normal for the patient's age      SELLA AND PITUITARY GLAND:  The gland is homogeneous and normal in size  The pituitary stalk is midline    Normal parasellar and suprasellar structures      ORBITS:  Normal      PARANASAL SINUSES: There is a pedunculated polyp identified within the midportion of the left maxillary sinus which appears to arise from the anterior wall of the sinus   Mild mucosal thickening within the right maxillary sinus      VASCULATURE:  Evaluation of the major intracranial vasculature demonstrates appropriate flow voids      CALVARIUM AND SKULL BASE:  Normal      EXTRACRANIAL SOFT TISSUES:  Normal      IMPRESSION:     Normal MRI of the brain      Dedicated imaging of the pituitary gland appears unremarkable      Future Appointments   Date Time Provider Saleem Guillermo   7/18/2023 12:40 PM Cinda Zuñiga MD [facility]   1/15/2024 11:00 AM Nadja Hu DO [facility]

## 2023-06-19 ENCOUNTER — TELEPHONE (OUTPATIENT)
Dept: FAMILY MEDICINE CLINIC | Facility: CLINIC | Age: 53
End: 2023-06-19

## 2023-06-19 PROCEDURE — 93227 XTRNL ECG REC<48 HR R&I: CPT | Performed by: INTERNAL MEDICINE

## 2023-06-19 NOTE — TELEPHONE ENCOUNTER
Patient informed       ----- Message from Shyann Crespo DO sent at 6/19/2023  2:59 PM EDT -----  ECHO is normal, awaiting Holter Monitor report

## 2023-06-19 NOTE — NURSING NOTE
"Made aware of patient IV contrast allergy via allergy alert  Patient scheduled for Ct of abdomen with IV contrast  No IV contrast allergy noted, called patient and he can't remember having a reaction to IV contrast \" I just tell them to give me the same thing they give the patients with shrimp allergy, I am not allergic to shrimp  \" Patient did inform scheduling that he had a reaction to PO contrast in the past of Itching he explained to me \"it was a pink thing they used to give you to drink in the past, tasted like Gatorade  I had itching that was resolved with benadryl  \"  Patient reassure me that he is not allergic to contrast    "

## 2023-06-22 ENCOUNTER — TELEPHONE (OUTPATIENT)
Dept: ENDOCRINOLOGY | Facility: HOSPITAL | Age: 53
End: 2023-06-22

## 2023-06-22 NOTE — TELEPHONE ENCOUNTER
I called [employer] per the [employer] and then was forwarded to [facility] [employer] to continue with the prior authorization case # [id number]  After answering her questions concerning the CPT code 72628 it was deemed that a Peer to Peer needed to be done      The provider was able to call the number at [phone] option 3 and verbally obtain an authorization    Auth # [id number]  Valid Dates 6/23/2023 to 8/23/2023

## 2023-06-23 LAB
LEFT EYE DIABETIC RETINOPATHY: NORMAL
RIGHT EYE DIABETIC RETINOPATHY: NORMAL

## 2023-06-26 ENCOUNTER — HOSPITAL ENCOUNTER (OUTPATIENT)
Dept: RADIOLOGY | Facility: HOSPITAL | Age: 53
Discharge: HOME | End: 2023-06-26
Attending: STUDENT IN AN ORGANIZED HEALTH CARE EDUCATION/TRAINING PROGRAM
Payer: COMMERCIAL

## 2023-06-26 DIAGNOSIS — E11.65 TYPE 2 DIABETES MELLITUS WITH HYPERGLYCEMIA, WITHOUT LONG-TERM CURRENT USE OF INSULIN (HCC): ICD-10-CM

## 2023-06-26 PROCEDURE — G1004 CDSM NDSC: HCPCS

## 2023-06-26 PROCEDURE — 74170 CT ABD WO CNTRST FLWD CNTRST: CPT

## 2023-06-26 RX ADMIN — IOHEXOL 100 ML: 350 INJECTION, SOLUTION INTRAVENOUS at 14:30

## 2023-06-27 ENCOUNTER — OFFICE VISIT (OUTPATIENT)
Dept: FAMILY MEDICINE CLINIC | Facility: CLINIC | Age: 53
End: 2023-06-27
Payer: COMMERCIAL

## 2023-06-27 VITALS
HEIGHT: 73 IN | WEIGHT: 246 LBS | BODY MASS INDEX: 32.6 KG/M2 | DIASTOLIC BLOOD PRESSURE: 66 MMHG | HEART RATE: 84 BPM | OXYGEN SATURATION: 93 % | SYSTOLIC BLOOD PRESSURE: 110 MMHG | TEMPERATURE: 97.5 F

## 2023-06-27 DIAGNOSIS — Z12.2 ENCOUNTER FOR SCREENING FOR LUNG CANCER: Primary | ICD-10-CM

## 2023-06-27 DIAGNOSIS — E11.65 TYPE 2 DIABETES MELLITUS WITH HYPERGLYCEMIA, WITHOUT LONG-TERM CURRENT USE OF INSULIN (HCC): ICD-10-CM

## 2023-06-27 DIAGNOSIS — F17.211 NICOTINE DEPENDENCE, CIGARETTES, IN REMISSION: ICD-10-CM

## 2023-06-27 LAB
CREAT UR-MCNC: 131 MG/DL
MICROALBUMIN UR-MCNC: 43.5 MG/L (ref 0–20)
MICROALBUMIN/CREAT 24H UR: 33 MG/G CREATININE (ref 0–30)

## 2023-06-27 PROCEDURE — 99213 OFFICE O/P EST LOW 20 MIN: CPT | Performed by: STUDENT IN AN ORGANIZED HEALTH CARE EDUCATION/TRAINING PROGRAM

## 2023-06-27 PROCEDURE — 82570 ASSAY OF URINE CREATININE: CPT | Performed by: STUDENT IN AN ORGANIZED HEALTH CARE EDUCATION/TRAINING PROGRAM

## 2023-06-27 PROCEDURE — 82043 UR ALBUMIN QUANTITATIVE: CPT | Performed by: STUDENT IN AN ORGANIZED HEALTH CARE EDUCATION/TRAINING PROGRAM

## 2023-06-27 NOTE — PROGRESS NOTES
Name: Italia Lea      : 1970      MRN: [id number]  Encounter Provider: Alonzo Bang DO  Encounter Date: 2023   Encounter department: [address]     1  Encounter for screening for lung cancer  -     CT lung screening program; Future; Expected date: 2023    2  Nicotine dependence, cigarettes, in remission  -     CT lung screening program; Future; Expected date: 2023    3  Type 2 diabetes mellitus with hyperglycemia, without long-term current use of insulin ([facility] )  Assessment & Plan: Tolerating Metformin 500mg QD  Follows with Endocrinology  Fasting 's  Diabetic foot exam completed today   Lab Results   Component Value Date    HGBA1C 9 7 (H) 2023       Orders:  -     Albumin / creatinine urine ratio; Future  -     Albumin / creatinine urine ratio         Subjective      Patient presents today for DM f/u   States that he has been checking sugar daily, reports that blood sugar has been elevated in 2-300's  Has been watching diet and even had a day where he did a 20hr fast  Patient reports compliance with his newly prescribed metformin  Review of Systems   Constitutional: Negative for chills and fever  HENT: Negative for congestion and rhinorrhea  Respiratory: Negative for cough and shortness of breath  Cardiovascular: Negative for chest pain and palpitations  Gastrointestinal: Negative for abdominal pain, constipation, diarrhea, nausea and vomiting  Neurological: Negative for dizziness and headaches         Current Outpatient Medications on File Prior to Visit   Medication Sig   • ACAI BERRY PO 1,000 mg in the morning   • Acidophilus Lactobacillus CAPS    • Ascorbic Acid (Vitamin C) 500 MG CAPS    • Ashwagandha 500 MG CAPS    • B Complex Vitamins (B COMPLEX 100 PO)    • Blood Glucose Monitoring Suppl (OneTouch Verio) w/Device KIT Use in the morning   • Boron 3 MG CAPS    • Cholecalciferol (Vitamin D3) 125 "MCG (5000 UT) CAPS    • Chromium Picolinate 500 MCG CAPS    • Coenzyme Q10 (CoQ10) 400 MG CAPS    • Garlic 6448 MG CAPS    • glucose blood (OneTouch Verio) test strip Use as instructed- 2x daily   • Lancets (onetouch ultrasoft) lancets Use as instructed- 2x day   • levothyroxine 150 mcg tablet Take 1 tablet (150 mcg total) by mouth daily   • Magnesium 400 MG CAPS    • Manganese 10 MG TABS    • metFORMIN (GLUCOPHAGE) 500 mg tablet Take 1 tablet (500 mg total) by mouth daily with dinner   • Milk Thistle 1000 MG CAPS Take 2,000 mg by mouth   • Multiple Vitamin (multivitamin) capsule Take 1 capsule by mouth daily   • Omega-3 Fatty Acids (fish oil) 1,000 mg Take 1,000 mg by mouth daily   • Potassium Citrate,Elemental K, 99 MG CAPS    • RA Aspirin EC 81 MG EC tablet Take 1 tablet (81 mg total) by mouth daily   • Saw Palmetto, Serenoa repens, (Saw [place] [employer]ries) 540 MG CAPS    • SYRINGE-NEEDLE, DISP, 5 ML 22G X 1\" 5 ML MISC Use every 14 (fourteen) days   • Syringe/Needle, Disp, (SYRINGE 3CC/25GX1\") 25G X 1\" 3 ML MISC Use every 14 (fourteen) days   • Testosterone Cypionate 200 MG/ML SOLN Inject 200 mg as directed every 14 (fourteen) days   • TURMERIC  mg in the morning   • Vitamin K 200 MCG/0 2ML LIQD    • Zinc Glycinate 20 MG CAPS    • [DISCONTINUED] vitamin A 2400 MCG (8000 UT) capsule        Objective     /66 (BP Location: Left arm, Patient Position: Sitting, Cuff Size: Large)   Pulse 84   Temp 97 5 °F (36 4 °C) (Temporal)   Ht 6' 1\" (1 854 m)   Wt 112 kg (246 lb)   SpO2 93%   BMI 32 46 kg/m²     Physical Exam  Vitals reviewed  Constitutional:       Appearance: He is obese  HENT:      Head: Normocephalic and atraumatic  Eyes:      Extraocular Movements: Extraocular movements intact  Cardiovascular:      Rate and Rhythm: Normal rate and regular rhythm  Pulses: no weak pulses          Dorsalis pedis pulses are 2+ on the right side and 2+ on the left side          Posterior tibial pulses are " 2+ on the right side and 2+ on the left side  Heart sounds: Normal heart sounds  Pulmonary:      Effort: Pulmonary effort is normal       Breath sounds: Normal breath sounds  Feet:      Right foot:      Skin integrity: No ulcer, skin breakdown, erythema, warmth, callus or dry skin  Left foot:      Skin integrity: No ulcer, skin breakdown, erythema, warmth, callus or dry skin  Neurological:      General: No focal deficit present  Mental Status: He is alert and oriented to person, place, and time  Psychiatric:         Mood and Affect: Mood normal          Behavior: Behavior normal           Diabetic Foot Exam    Patient's shoes and socks removed  Right Foot/Ankle   Right Foot Inspection  Skin Exam: skin normal and skin intact  No dry skin, no warmth, no callus, no erythema, no maceration, no abnormal color, no pre-ulcer, no ulcer and no callus  Toe Exam: ROM and strength within normal limits  Sensory   Proprioception: intact  Monofilament testing: intact    Vascular  Capillary refills: < 3 seconds  The right DP pulse is 2+  The right PT pulse is 2+  Left Foot/Ankle  Left Foot Inspection  Skin Exam: skin normal and skin intact  No dry skin, no warmth, no erythema, no maceration, normal color, no pre-ulcer, no ulcer and no callus  Toe Exam: ROM and strength within normal limits  Sensory   Proprioception: intact  Monofilament testing: intact    Vascular  Capillary refills: < 3 seconds  The left DP pulse is 2+  The left PT pulse is 2+       Assign Risk Category  No deformity present  No loss of protective sensation  No weak pulses  Risk: [address] [facility], DO

## 2023-06-27 NOTE — ASSESSMENT & PLAN NOTE
Tolerating Metformin 500mg QD  Follows with Endocrinology  Fasting 's  Diabetic foot exam completed today   Lab Results   Component Value Date    HGBA1C 9 7 (H) 06/12/2023

## 2023-06-27 NOTE — PATIENT INSTRUCTIONS
Type 2 Diabetes Management for Adults   AMBULATORY CARE:   Type 2 diabetes  is a disease that affects how your body uses glucose (sugar)  Either your body cannot make enough insulin, or it cannot use the insulin correctly  It is important to keep diabetes controlled to prevent damage to your heart, blood vessels, and other organs  Management will help you feel well and enjoy your daily activities  Your diabetes care team providers can help you make a plan to fit diabetes care into your schedule  Your plan can change over time to fit your needs and your family's needs  Have someone call your local emergency number (911 in the [address]) if:   • You cannot be woken  • You have signs of diabetic ketoacidosis:     ? confusion, fatigue    ? vomiting    ? rapid heartbeat    ? fruity smelling breath    ? extreme thirst    ? dry mouth and skin    • You have any of the following signs of a heart attack:      ? Squeezing, pressure, or pain in your chest    ? You may  also have any of the following:     - Discomfort or pain in your back, neck, jaw, stomach, or arm    - Shortness of breath    - Nausea or vomiting    - Lightheadedness or a sudden cold sweat    • You have any of the following signs of a stroke:      ? Numbness or drooping on one side of your face     ? Weakness in an arm or leg    ? Confusion or difficulty speaking    ? Dizziness, a severe headache, or vision loss    Call your doctor or diabetes care team provider if:   • You have a sore or wound that will not heal     • You have a change in the amount you urinate  • Your blood sugar levels are higher than your target goals  • You often have lower blood sugar levels than your target goals  • Your skin is red, dry, warm, or swollen  • You have trouble coping with diabetes, or you feel anxious or depressed  • You have questions or concerns about your condition or care      What you need to know about high blood sugar levels:  High blood sugar levels may not cause any symptoms  You may feel more thirsty or urinate more often than usual  Over time, high blood sugar levels can damage your nerves, blood vessels, tissues, and organs  The following can increase your blood sugar levels:  • Large meals or large amounts of carbohydrates at one time    • Less physical activity    • Stress    • Illness    • A lower dose of diabetes medicine or insulin, or a late dose    What you need to know about low blood sugar levels:  Symptoms include feeling shaky, dizzy, irritable, or confused  You can prevent symptoms by keeping your blood sugar levels from going too low  • Treat a low blood sugar level right away:      ? Drink 4 ounces of juice or have 1 tube of glucose gel  ? Check your blood sugar level again 10 to 15 minutes later  ? When the level goes back to normal, eat a meal or snack to prevent another decrease  • Keep glucose gel, raisins, or hard candy with you at all times to treat a low blood sugar level  • Your blood sugar level can get too low if you take diabetes medicine or insulin and do not eat enough food  • If you use insulin, check your blood sugar level before you exercise  ? If your blood sugar level is below 100 mg/dL, eat 4 crackers or 2 ounces of raisins, or drink 4 ounces of juice  ? Check your level every 30 minutes if you exercise longer than 1 hour  ? You may need a snack during or after exercise  What you can do to manage your blood sugar levels:   • Check your blood sugar levels as directed and as needed  Several items are available to use to check your levels  You may need to check by testing a drop of blood in a glucose monitor  You may instead be given a continuous glucose monitoring (CGM) device  The device is worn at all times  The CGM checks your blood sugar level every 5 minutes  It sends results to an electronic device such as a smart phone  A CGM can be used with or without an insulin pump   You and your diabetes care team providers will decide on the best method for you  The goal for blood sugar levels before meals  is between 80 and 130 mg/dL and 2 hours after eating  is lower than 180 mg/dL  • Make healthy food choices  Work with a dietitian to develop a meal plan that works for you and your schedule  A dietitian can help you learn how to eat the right amount of carbohydrates during your meals and snacks  Carbohydrates can raise your blood sugar level if you eat too many at one time  Examples of foods that contain carbohydrates are breads, cereals, rice, pasta, and sweets  • Eat high-fiber foods as directed  Fiber helps improve blood sugar levels  Fiber also lowers your risk for heart disease and other problems diabetes can cause  Examples of high-fiber foods include vegetables, whole-grain bread, and beans such as mattson beans  Your dietitian can tell you how much fiber to have each day  • Get regular physical activity  Physical activity can help you get to your target blood sugar level goal and manage your weight  Get at least 150 minutes of moderate to vigorous aerobic physical activity each week  Do not miss more than 2 days in a row  Do not sit longer than 30 minutes at a time  Your healthcare provider can help you create an activity plan  The plan can include the best activities for you and can help you build your strength and endurance  • Maintain a healthy weight  Ask your team what a healthy weight is for you  A healthy weight can help you control diabetes and prevent heart disease  Ask your team to help you create a weight loss plan, if needed  Weight loss can help make a difference in managing diabetes  Your team will help you set a weight-loss goal, such as 10 to 15 pounds, or 5% of your extra weight  Together you and your team can set manageable weight loss goals  • Take your diabetes medicine or insulin as directed    You may need diabetes medicine, insulin, or both to help control your blood sugar levels  Your healthcare provider will teach you how and when to take your diabetes medicine or insulin  You will also be taught about side effects oral diabetes medicine can cause  Insulin may be injected or given through a pump or pen  You and your providers will decide on the best method for you:    ? An insulin pump  is an implanted device that gives your insulin 24 hours a day  An insulin pump prevents the need for multiple insulin injections in a day  ? An insulin pen  is a device prefilled with the right amount of insulin  ? You and your family members will be taught how to draw up and give insulin  if this is the best method for you  Your providers will also teach you how to dispose of needles and syringes  ? You will learn how much insulin you need  and when to give it  You will be taught when not to give insulin  You will also be taught what to do if your blood sugar level drops too low  This may happen if you take insulin and do not eat the right amount of carbohydrates  More ways to manage type 2 diabetes:   • Wear medical alert identification  Wear medical alert jewelry or carry a card that says you have diabetes  Ask your provider where to get these items  • Do not smoke  Nicotine and other chemicals in cigarettes and cigars can cause lung and blood vessel damage  It also makes it more difficult to manage your diabetes  Ask your provider for information if you currently smoke and need help to quit  Do not use e-cigarettes or smokeless tobacco in place of cigarettes or to help you quit  They still contain nicotine  • Check your feet each day for cuts, scratches, calluses, or other wounds  Look for redness and swelling, and feel for warmth  Wear shoes that fit well  Check your shoes for rocks or other objects that can hurt your feet  Do not walk barefoot or wear shoes without socks   Wear cotton socks to help keep your feet dry  • Ask about vaccines you may need  You have a higher risk for serious illness if you get the flu, pneumonia, COVID-19, or hepatitis  Ask your provider if you should get vaccines to prevent these or other diseases, and when to get the vaccines  • Talk to your provider if you become stressed about diabetes care  Sometimes being able to fit diabetes care into your life can cause increased stress  The stress can cause you not to take care of yourself properly  Your care team providers can help by offering tips about self-care  Your providers may suggest you talk to a mental health provider who can listen and offer help with self-care issues  • Have your A1c checked as directed  Your provider may check your A1c every 3 months, or 2 times each year if your diabetes is controlled  An A1c test shows the average amount of sugar in your blood over the past 2 to 3 months  Your provider will tell you what your A1c level should be  • Have screening tests as directed  Your provider may recommend screening for complications of diabetes and other conditions that may develop  Some screenings may begin right away and some may happen within the first 5 years of diagnosis:    ? Examples of diabetes complications  include kidney problems, high cholesterol, high blood pressure, blood vessel problems, eye problems, and sleep apnea  ? You may be screened for a low vitamin B level  if you take oral diabetes medicine for a long time  ? Women of childbearing years may be screened  for polycystic ovarian syndrome (PCOS)  Follow up with your doctor or diabetes care team providers as directed: You may need to have blood tests done before your follow-up visit  The test results will show if changes need to be made in your treatment or self-care  Talk to your provider if you cannot afford your medicine  Write down your questions so you remember to ask them during your visits    © Copyright [employer] 2022 Information is for End User's use only and may not be sold, redistributed or otherwise used for commercial purposes  The above information is an  only  It is not intended as medical advice for individual conditions or treatments  Talk to your doctor, nurse or pharmacist before following any medical regimen to see if it is safe and effective for you

## 2023-06-28 LAB
C PEPTIDE SERPL-MCNC: 2.5 NG/ML (ref 1.1–4.4)
GAD65 AB SER-ACNC: <5 U/ML (ref 0–5)
GH SERPL-MCNC: 0.1 NG/ML (ref 0–10)
IGF-I SERPL-MCNC: 34 NG/ML (ref 74–255)
ISLET CELL512 AB SER-ACNC: <7.5 U/ML
T4 FREE SERPL-MCNC: 1.4 NG/DL (ref 0.82–1.77)
ZINC SERPL-MCNC: 98 UG/DL (ref 44–115)

## 2023-06-30 LAB
ACTH PLAS-MCNC: 17.3 PG/ML (ref 7.2–63.3)
CORTIS AM PEAK SERPL-MCNC: 12.5 UG/DL (ref 6.2–19.4)
TSH SERPL DL<=0.005 MIU/L-ACNC: 1.7 UIU/ML (ref 0.45–4.5)

## 2023-07-21 ENCOUNTER — OFFICE VISIT (OUTPATIENT)
Dept: ENDOCRINOLOGY | Facility: HOSPITAL | Age: 53
End: 2023-07-21
Payer: COMMERCIAL

## 2023-07-21 VITALS
HEIGHT: 73 IN | DIASTOLIC BLOOD PRESSURE: 70 MMHG | SYSTOLIC BLOOD PRESSURE: 118 MMHG | BODY MASS INDEX: 32.47 KG/M2 | HEART RATE: 90 BPM | OXYGEN SATURATION: 98 % | WEIGHT: 245 LBS

## 2023-07-21 DIAGNOSIS — K76.0 NON-ALCOHOLIC FATTY LIVER DISEASE: ICD-10-CM

## 2023-07-21 DIAGNOSIS — E78.2 MIXED HYPERLIPIDEMIA: ICD-10-CM

## 2023-07-21 DIAGNOSIS — E89.0 POSTABLATIVE HYPOTHYROIDISM: ICD-10-CM

## 2023-07-21 DIAGNOSIS — E11.65 TYPE 2 DIABETES MELLITUS WITH HYPERGLYCEMIA, WITHOUT LONG-TERM CURRENT USE OF INSULIN (HCC): Primary | ICD-10-CM

## 2023-07-21 DIAGNOSIS — E23.0 GROWTH HORMONE DEFICIENCY (HCC): ICD-10-CM

## 2023-07-21 DIAGNOSIS — E34.9 TESTOSTERONE DEFICIENCY: ICD-10-CM

## 2023-07-21 PROCEDURE — 99215 OFFICE O/P EST HI 40 MIN: CPT | Performed by: STUDENT IN AN ORGANIZED HEALTH CARE EDUCATION/TRAINING PROGRAM

## 2023-07-21 RX ORDER — TESTOSTERONE CYPIONATE 200 MG/ML
100 VIAL (ML) INTRAMUSCULAR
Qty: 10 ML | Refills: 1 | Status: SHIPPED | OUTPATIENT
Start: 2023-07-21

## 2023-07-21 RX ORDER — PREDNISOLONE ACETATE 10 MG/ML
SUSPENSION/ DROPS OPHTHALMIC
COMMUNITY
Start: 2023-07-04

## 2023-07-21 NOTE — PROGRESS NOTES
7/21/2023    Assessment/Plan        Problem List Items Addressed This Visit        Endocrine    Hypothyroidism    Relevant Orders    Hemoglobin A1C    Albumin / creatinine urine ratio    Lipid panel    Comprehensive metabolic panel    Insulin-like growth factor 1 (IGF-1)    Growth hormone    Testosterone, free, total    CBC and differential- Lab Collect    PSA, total screen Lab Collect    Type 2 diabetes mellitus with hyperglycemia, without long-term current use of insulin (HCC) - Primary    Relevant Orders    Hemoglobin A1C    Albumin / creatinine urine ratio    Lipid panel    Comprehensive metabolic panel    Insulin-like growth factor 1 (IGF-1)    Growth hormone    Testosterone, free, total    CBC and differential- Lab Collect    PSA, total screen Lab Collect    Growth hormone deficiency (HCC)       Other    Testosterone deficiency    Relevant Medications    Testosterone Cypionate 200 MG/ML SOLN    Other Relevant Orders    Hemoglobin A1C    Albumin / creatinine urine ratio    Lipid panel    Comprehensive metabolic panel    Insulin-like growth factor 1 (IGF-1)    Growth hormone    Testosterone, free, total    CBC and differential- Lab Collect    PSA, total screen Lab Collect    Mixed hyperlipidemia    Relevant Orders    Hemoglobin A1C    Albumin / creatinine urine ratio    Lipid panel    Comprehensive metabolic panel    Insulin-like growth factor 1 (IGF-1)    Growth hormone    Testosterone, free, total    CBC and differential- Lab Collect    PSA, total screen Lab Collect       Assessment/Plan:  Patient is a 51yM with PMHx of hypothyroidism and secondary hypogonadism unclear etiology- ?radiation induced while tx of lymphoma, with other PMHx of t2DM, hyperlipidemia, class 2 obesity, NAFLD who presents today for follow up     1) Hypothyroidism- given normal TSH and not low, discussed has primary hypothyroidism. Currently thyroid based on labs and clinically.   C/w levothyroxine 150mcg daily, repeat labs in 3 months- order next visit    2) Hypogonadism- given low LH/FSH concerned about secondary hypogonadism, most likely d/t iatrogenic testosterone use. Unfortunately since on T replacement for long time now, has secondary hypogonadism. MRI pit neg, genetic issues cannot be ruled out. Feeling much better since switching to IM testosterone than gel. Feels dose not lasting long enough, will switch to 100mg every week, check testosterone, CBC, PSA in 1 month- peak T level- 3 days after shot. 2) Diabetes:- Patients HbA1C is elevated at 9.7%, Pancreatic ca, FELIX and cushing ruled out. Most likely has T2DM at this time. C peptide normal.   BG have improved since being on keto diet and metformin 500mg daily, did discuss given pancrease functing ok, can consider GLP-1 agonist instead to help weight loss, lipid and also NAFLD but patient would like to try with lifestyle only for now. [facility] with this,   Repeat labs with A1C, lipid in 1 month for follow up       3) Hyperlipidemia:- patient currently not on any medication, will repeat in 1 month, he will not consider statin or Zetia as has myalgia on this. Recommended red rice yeast    4) GH def:- Patient does have issues with obesity which can cause false low IGF-1 level, however given GH 0.1 which is low and IGF-1 level even lower, will perform glucagon stim test to confirm if has [facility] def. Patient did have normal pit scan in the past with rest of Ant pit hormone panel neg with normal TSH, cortisol, ACTH, Prolactin, LH/FSH low d/t T use. Therefore would most likely be idiopathic GH def if positive. If [facility] level >1.0 with gluagon stim rules out [facility] def. Given his BMI >30.      Of note patient on plenty of supplementations- discuss these may cause plenty of his other symptoms and also lab issues, recommended to really consider holding these but patient not interested    RTC in 6-8 weeks    I have spent a total time of 40 minutes on 07/21/23 in caring for this patient including Diagnostic results, Prognosis, Risks and benefits of tx options, Instructions for management, Patient and family education, Reviewing / ordering tests, medicine, procedures   and Obtaining or reviewing history  . CC: panhypopituitarism, diabetes    History of Present Illness     HPI: Shun Yost is a 47y.o. year old male with history of lymphoma in 1996 tx with radiation and chemotherapy- neck to pelvis who was then diagnosed with hypothyroidism in 2009 on levothyroxine and also hypogonadism on testosterone replacement. Other PMHx of ?hemachromatosis, other PMHx of T2DM, hyperlipidemia and class 1 obesity, NAFLD who presents today for follow up    Hypogonadism:- switched to IM testosterone and reports feeling much better since, reports his myalgia and also energy is much better. Does feel his energy going down 2-3 days before the next shot and feels medication not lasting long enough. Currently on testosterone 200mg every 2 weeks. Hypothyroidism:- patient currently on 150mcg levothyroxine daily, most recent TSH and Free t4 normal. Reports symptoms of myalgia and temperature imbalance are improved. Diabetes:- patient reports previously diabetes was under control. Previously on metformin, reports was on keto diet and lost weight and diabetes was under control. Gained a lot of weight since stopped keto which lead to progression of diabetes to HbA1C of 9.7%, given rapid progression Pancreatic Ca ruled out with CT abdomen (did show fatty liver disease), cushing ruled out with normal ACTH and cortisol and FELIX also ruled out. Last visit started on metformin 500mg daily. Reports this did not help BG either but he started back on keto diet and since then BG have been in 120-150 range. Checks BG 2x daily. Patient also had a recent episode of left retinal dettachment after a recent lazer surgery. Has been healing from this and has limited vision on that eye.      Hyperlipidemia:- patient reports he wants to work with diet, exercise and weight loss to improve his lipid level. Last lipid . , 225 cholestrol, 164 LDL. Patient denies any hx of pancreatitits or heart attack     Low GH:- Patient previously found to have low IGF-1 level 55  repeat ordered last visit was even lower at IGF 34 with [address] 0.1. patient reports feeling better than last visit. Family hx and social hx as below     Review of Systems   Constitutional: Positive for unexpected weight change. Negative for diaphoresis and fatigue. Respiratory: Negative for shortness of breath. Cardiovascular: Negative for chest pain and palpitations. Gastrointestinal: Negative for constipation and diarrhea. Endocrine: Negative for cold intolerance, polydipsia and polyuria. Musculoskeletal: Negative for arthralgias and myalgias. Neurological: Negative. Psychiatric/Behavioral: Negative.         Historical Information   Past Medical History:   Diagnosis Date   • Allergic Seasonal   • Cancer ([facility])    • Diabetes mellitus ([facility])    • Disease of thyroid gland      Past Surgical History:   Procedure Laterality Date   • CATARACT EXTRACTION, BILATERAL     • EYE SURGERY  24    Cataract   • HERNIA REPAIR     • LIVER SURGERY     • LYMPH NODE BIOPSY     • SPLENECTOMY, PARTIAL     • TONSILLECTOMY       Social History   Social History     Substance and Sexual Activity   Alcohol Use Not Currently     Social History     Substance and Sexual Activity   Drug Use Not Currently     Social History     Tobacco Use   Smoking Status Former   • Packs/day: 2.00   • Years: 25.00   • Total pack years: 50.00   • Types: Cigarettes   • Start date: 1984   • Quit date: 7/15/2010   • Years since quittin.0   • Passive exposure: Past   Smokeless Tobacco Never     Family History:   Family History   Problem Relation Age of Onset   • Diabetes Mother    • Cancer Mother    • Heart disease Mother    • Coronary artery disease Mother    • COPD Mother    • Arthritis Mother    • Diabetes type II Mother    • Heart disease Father    • Diabetes Father    • Cancer Father    • Coronary artery disease Father    • COPD Father    • Arthritis Father    • Diabetes type II Father    • Hypertension Father    • Hyperlipidemia Father    • Lupus Sister    • Autoimmune disease Sister         Lupus   • Cancer Sister         Melanoma   • Vision loss Maternal Grandfather    • Prostate cancer Paternal Grandfather    • Hearing loss Sister    • Diabetes type II Sister    • Bipolar disorder Brother    • Diabetes type II Brother    • Hypertension Brother        Meds/Allergies   Current Outpatient Medications   Medication Sig Dispense Refill   • ACAI BERRY PO 1,000 mg in the morning     • Acidophilus Lactobacillus CAPS      • Ascorbic Acid (Vitamin C) 500 MG CAPS      • Ashwagandha 500 MG CAPS      • B Complex Vitamins (B COMPLEX 100 PO)      • Blood Glucose Monitoring Suppl (OneTouch Verio) w/Device KIT Use in the morning 1 kit 0   • Whitman 3 MG CAPS      • Cholecalciferol (Vitamin D3) 125 MCG (5000 UT) CAPS      • Chromium Picolinate 500 MCG CAPS      • Coenzyme Q10 (CoQ10) 400 MG CAPS      • Garlic 6009 MG CAPS      • glucose blood (OneTouch Verio) test strip Use as instructed- 2x daily 200 each 1   • Lancets (onetouch ultrasoft) lancets Use as instructed- 2x day 200 each 1   • levothyroxine 150 mcg tablet Take 1 tablet (150 mcg total) by mouth daily 90 tablet 3   • Magnesium 400 MG CAPS      • Manganese 10 MG TABS      • metFORMIN (GLUCOPHAGE) 500 mg tablet Take 1 tablet (500 mg total) by mouth daily with dinner 90 tablet 1   • Milk Thistle 1000 MG CAPS Take 2,000 mg by mouth     • Multiple Vitamin (multivitamin) capsule Take 1 capsule by mouth daily     • Omega-3 Fatty Acids (fish oil) 1,000 mg Take 1,000 mg by mouth daily     • Potassium Citrate,Elemental K, 99 MG CAPS      • prednisoLONE acetate (PRED FORTE) 1 % ophthalmic suspension instill 1 drop into left eye four times a day     • RA Aspirin EC 81 MG EC tablet Take 1 tablet (81 mg total) by mouth daily 90 tablet 1   • Saw Palmetto, Serenoa repens, (Saw [place] Berries) 540 MG CAPS      • SYRINGE-NEEDLE, DISP, 5 ML 22G X 1" 5 ML MISC Use every 14 (fourteen) days 50 each 1   • Syringe/Needle, Disp, (SYRINGE 3CC/25GX1") 25G X 1" 3 ML MISC Use every 14 (fourteen) days 50 each 1   • Testosterone Cypionate 200 MG/ML SOLN Inject 100 mg as directed every 7 days 10 mL 1   • TURMERIC  mg in the morning     • Vitamin K 200 MCG/0.2ML LIQD      • Zinc Glycinate 20 MG CAPS        No current facility-administered medications for this visit. Allergies   Allergen Reactions   • Allyl Isothiocyanate Other (See Comments)   • Other Nasal Congestion     Mustard   • Keflex [Cephalexin] Rash       Objective   Vitals: Blood pressure 118/70, pulse 90, height 6' 1" (1.854 m), weight 111 kg (245 lb), SpO2 98 %. Invasive Devices     None               Body mass index is 32.32 kg/m². /70   Pulse 90   Ht 6' 1" (1.854 m)   Wt 111 kg (245 lb)   SpO2 98%   BMI 32.32 kg/m²    Wt Readings from Last 3 Encounters:   07/21/23 111 kg (245 lb)   06/27/23 112 kg (246 lb)   06/16/23 114 kg (251 lb)       GEN: NAD  E/n/m nl facies, hearing intact bilat, tongue midline, lips nl  Eyes: no stare or proptosis, nl lids and conjunctiva, EOMI  Neck: trachea midline, thyroid NT to palpation, nl in size, no nodules or neck masses noted, no cervical LAD  CV; heart reg rate s1s2 nl, no m/r/g appreciated, no LINDA  Resp: CTAB, good effort  Ab+BS  Neuro: no tremor, 2+ DTRs in BUE  MS: no c/c in digits, moves all 4 ext, nl muscle bulk, gait nl  Skin: warm and dry, no palmar erythema  Ext: no c/c in digits, no edema bilaterally, 2+ DP and PT pulses bilat, no breaks in skin/ulcers on feet, sensation intact to monofilament testing on plantar surfaces bilat  Psych: nl mood and affect, no gross lapses in memory    Physical Exam  Constitutional:       Appearance: Normal appearance.  He is obese. Cardiovascular:      Rate and Rhythm: Normal rate. Heart sounds: Murmur heard. Pulmonary:      Effort: Pulmonary effort is normal.   Abdominal:      General: Abdomen is flat. Bowel sounds are normal.      Palpations: Abdomen is soft. Musculoskeletal:      Cervical back: Normal range of motion. Skin:     General: Skin is warm. Capillary Refill: Capillary refill takes less than 2 seconds. Neurological:      General: No focal deficit present. Mental Status: He is alert. Psychiatric:         Mood and Affect: Mood normal.         The history was obtained from the review of the chart and from the patient.     Lab Results:       Latest Reference Range & Units 06/20/23 08:33 06/29/23 08:57   ACTH 7.2 - 63.3 pg/mL  17.3   GROWTH HORMONE 0.0 - 10.0 ng/mL 0.1    INSULIN-LIKE GROWTH FACTOR-1 74 - 255 ng/mL 34 (L)    (L): Data is abnormally low     Latest Reference Range & Units 06/20/23 08:33   IA-2 AUTOANTIBODIES U/mL <7.5      Latest Reference Range & Units 06/20/23 08:33 06/29/23 08:57   C-PEPTIDE 1.1 - 4.4 ng/mL 2.5    TSH, POC 0.450 - 4.500 uIU/mL  1.700   Free T4 0.82 - 1.77 ng/dL 1.40       Latest Reference Range & Units 06/27/23 11:11   EXT Creatinine Urine mg/dL 131.0   MICROALBUMIN/CREATININE RATIO 0 - 30 mg/g creatinine 33 (H)   MICROALBUM.,U,RANDOM 0.0 - 20.0 mg/L 43.5 (H)   (H): Data is abnormally high   Latest Reference Range & Units 06/29/23 08:57   Cortisol AM 6.2 - 19.4 ug/dL 12.5      Latest Reference Range & Units 01/12/23 14:30   Cholesterol See Comment mg/dL 255 (H)   Triglycerides See Comment mg/dL 229 (H)   HDL >=40 mg/dL 45   Non-HDL Cholesterol mg/dl 210   LDL Calculated 0 - 100 mg/dL 164 (H)   (H): Data is abnormally high    Component 06/06/2022 06/06/2022 11/22/2021 11/22/2021 04/14/2021 04/14/2021            Direct Low Density Lipoprotein (d-LDL) 186 High      -- 198 High      -- 171 High      --   Cholesterol -- 240 High     -- 244 High     -- 206 High  Triglycerides -- 116 -- 145 -- 78   HDL -- 39 Low     -- 50 -- 41   LDL Calculated -- 178 High     -- 165 High     -- 149 High       Cholesterol:HDL Ratio -- 6.2 High     -- 4.9          Latest Reference Range & Units 01/12/23 14:30 06/12/23 08:29   Hemoglobin A1C 4.8 - 5.6 % 6.2 (H) 9.7 (H)   eAG, EST AVG Glucose mg/dl 131    (H): Data is abnormally high     Latest Reference Range & Units 04/07/23 08:20 04/21/23 08:40   ACTH 7.2 - 63.3 pg/mL See written report from [facility] 29.4   Cortisol - AM 4.2 - 22.4 ug/dL  20.5      Latest Reference Range & Units 03/30/23 08:39 04/06/23 07:52   INSULIN-LIKE GROWTH FACTOR-1 74 - 255 ng/mL 55 (L)    LUTEINIZING HORMONE 1.2 - 10.6 mIU/mL <0.2 (L)    FSH, POC 0.7 - 10.8 mIU/mL <0.2 (L)    PROLACTIN 2.5 - 17.4 ng/mL  12.8   (L): Data is abnormally low     Latest Reference Range & Units 01/12/23 14:30 03/30/23 08:39   TSH 3RD GENERATON 0.450 - 4.500 uIU/mL 2.080    Free T4 0.76 - 1.46 ng/dL  1.09   T3, Free 2.30 - 4.20 pg/mL 3.01       Latest Reference Range & Units 01/12/23 14:30 02/02/23 11:36 03/03/23 10:35 03/30/23 08:39 05/18/23 08:15   Testosterone, Total, LC/ - 916 ng/dL 89 (L) 228 (L) 156 (L) 61 (L) 207 (L)   TESTOSTERONE FREE 7.2 - 24.0 pg/mL 4.7 (L) 11.9 5.8 (L) 5.4 (L) 9.3   (L): Data is abnormally low    MRI BRAIN AND SELLA  WITH AND WITHOUT CONTRAST     INDICATION:  E23.0: Hypopituitarism     COMPARISON: None.     TECHNIQUE:  Multiplanar, multisequence imaging of the brain and sella was performed before and after gadolinium administration.     Targeted images of the sella were performed requiring additional time at acquisition and interpretation of approximately 25%     IV Contrast:  10 mL of Gadobutrol injection (SINGLE-DOSE)     IMAGE QUALITY:  Diagnostic.     FINDINGS:     BRAIN PARENCHYMA:  There is no discrete mass, mass effect or midline shift. Brainstem and cerebellum demonstrate normal signal. There is no intracranial hemorrhage.   There is no evidence of acute infarction and diffusion imaging is unremarkable. There   are no white matter changes in the cerebral hemispheres. Normal postcontrast imaging.     VENTRICLES:  Normal for the patient's age.     SELLA AND PITUITARY GLAND:  The gland is homogeneous and normal in size. The pituitary stalk is midline. Normal parasellar and suprasellar structures.     ORBITS:  Normal.     PARANASAL SINUSES: There is a pedunculated polyp identified within the midportion of the left maxillary sinus which appears to arise from the anterior wall of the sinus.  Mild mucosal thickening within the right maxillary sinus.     VASCULATURE:  Evaluation of the major intracranial vasculature demonstrates appropriate flow voids.     CALVARIUM AND SKULL BASE:  Normal.     EXTRACRANIAL SOFT TISSUES:  Normal.     IMPRESSION:     Normal MRI of the brain.     Dedicated imaging of the pituitary gland appears unremarkable.     Future Appointments   Date Time Provider [address]   8/6/2023 11:00 AM MO CT 1 [facility]   9/15/2023  1:40 PM Matti Robles MD ENDO  [facility]   10/5/2023 11:20 AM Mancil Lesches, DO [facility]   1/15/2024 11:00 AM Kanchan Jones DO [facility]

## 2023-08-02 DIAGNOSIS — E34.9 TESTOSTERONE DEFICIENCY: ICD-10-CM

## 2023-08-02 RX ORDER — NEEDLES, DISPOSABLE 27GX0.375"
NEEDLE, DISPOSABLE MISCELLANEOUS WEEKLY
Qty: 100 EACH | Refills: 1 | Status: SHIPPED | OUTPATIENT
Start: 2023-08-02

## 2023-08-02 RX ORDER — TESTOSTERONE CYPIONATE 200 MG/ML
100 VIAL (ML) INTRAMUSCULAR
Qty: 10 ML | Refills: 1 | Status: SHIPPED | OUTPATIENT
Start: 2023-08-02

## 2023-08-04 ENCOUNTER — DOCUMENTATION (OUTPATIENT)
Dept: ENDOCRINOLOGY | Facility: HOSPITAL | Age: 53
End: 2023-08-04

## 2023-08-04 NOTE — PROGRESS NOTES
[employer] paperwork was received and filled out by the provider and sent back to the company via fax [phone]

## 2023-08-06 ENCOUNTER — HOSPITAL ENCOUNTER (OUTPATIENT)
Dept: CT IMAGING | Facility: HOSPITAL | Age: 53
Discharge: HOME | End: 2023-08-06
Attending: STUDENT IN AN ORGANIZED HEALTH CARE EDUCATION/TRAINING PROGRAM
Payer: COMMERCIAL

## 2023-08-06 DIAGNOSIS — Z12.2 ENCOUNTER FOR SCREENING FOR LUNG CANCER: ICD-10-CM

## 2023-08-06 DIAGNOSIS — F17.211 NICOTINE DEPENDENCE, CIGARETTES, IN REMISSION: ICD-10-CM

## 2023-08-06 PROCEDURE — 71271 CT THORAX LUNG CANCER SCR C-: CPT

## 2023-08-14 DIAGNOSIS — I25.10 CORONARY ARTERY CALCIFICATION: ICD-10-CM

## 2023-08-14 DIAGNOSIS — I25.84 CORONARY ARTERY CALCIFICATION: ICD-10-CM

## 2023-08-14 DIAGNOSIS — R00.2 PALPITATIONS: Primary | ICD-10-CM

## 2023-08-15 ENCOUNTER — OFFICE VISIT (OUTPATIENT)
Dept: GASTROENTEROLOGY | Facility: CLINIC | Age: 53
End: 2023-08-15
Payer: COMMERCIAL

## 2023-08-15 VITALS
WEIGHT: 240.8 LBS | SYSTOLIC BLOOD PRESSURE: 120 MMHG | HEART RATE: 80 BPM | BODY MASS INDEX: 31.91 KG/M2 | HEIGHT: 73 IN | DIASTOLIC BLOOD PRESSURE: 76 MMHG

## 2023-08-15 DIAGNOSIS — E66.9 OBESITY (BMI 30-39.9): ICD-10-CM

## 2023-08-15 DIAGNOSIS — R79.89 ABNORMAL LIVER FUNCTION TESTS: ICD-10-CM

## 2023-08-15 DIAGNOSIS — K76.0 NON-ALCOHOLIC FATTY LIVER DISEASE: Primary | ICD-10-CM

## 2023-08-15 DIAGNOSIS — E11.9 TYPE 2 DIABETES MELLITUS WITHOUT COMPLICATION, WITHOUT LONG-TERM CURRENT USE OF INSULIN (HCC): ICD-10-CM

## 2023-08-15 PROCEDURE — 99244 OFF/OP CNSLTJ NEW/EST MOD 40: CPT | Performed by: STUDENT IN AN ORGANIZED HEALTH CARE EDUCATION/TRAINING PROGRAM

## 2023-08-15 NOTE — PATIENT INSTRUCTIONS
For patients with fatty liver disease, weight loss through diet and exercise is recommended. To MAINTAIN weight you must use up at least as many calories as you take in. To LOSE weight you must use up more calories than you take in. Start by knowing how many calories you should be eating and drinking to maintain your weight. Nutrition and calorie information on food labels is typically based on a 2,000 calorie diet. You may need fewer or more calories depending on several factors including age, gender, and level of physical activity. Increase the amount and intensity of your physical activity to match the number of calories you take in. Aim for at least 150 minutes of moderate physical activity or 75 minutes of vigorous physical activity - or an equal combination of both - each week. Regular physical activity can help you maintain your weight, keep off weight that you lose and help you reach physical and cardiovascular fitness. If it's hard to schedule regular exercise sessions, try aiming for sessions of at least 10 minutes spread throughout the week. As you make daily food choices, base your eating pattern on these recommendations:   Eat a variety of fresh, frozen and canned vegetables and fruits without high-calorie sauces or added salt and sugars. Replace high-calorie foods with fruits and vegetables. Choose fiber-rich whole grains for most grain servings. Choose poultry and fish without skin and prepare them in healthy ways without added saturated and trans fat. If you choose to eat meat, look for the leanest cuts available and prepare them in healthy and delicious ways. Eat a variety of fish at least twice a week, especially fish containing omega-3 fatty acids (for example, salmon, trout and herring). Select fat-free (skim) and low-fat (1%) dairy products. Avoid foods containing partially hydrogenated vegetable oils to reduce trans fat in your diet.    Limit saturated fat and trans fat and replace them with the better fats, monounsaturated and polyunsaturated. If you need to lower your blood cholesterol, reduce saturated fat to no more than 5 to 6 percent of total calories. For someone eating 2,000 calories a day, that's about 13 grams of saturated fat. Cut back on beverages and foods with added sugars. Choose foods with less sodium and prepare foods with little or no salt. To prevent fluid problems in the legs and abdomen aim to eat no more than 2,000 milligrams of sodium per day. If you can't meet these goals right now, even reducing sodium intake by 1,000 mg per day can benefit blood pressure. If you drink alcohol, drink in moderation. That means no more than one drink per day if you're a woman and no more than two drinks per day if you're a man. Please note that the effect of alcohol on NAFLD is unclear. Some studies show that moderate alcohol use may be beneficial, but others show no benefit and even harm. Discuss specific recommendations with your hepatologist.   Studies have shown that drinking up to 4 cups of caffeinated coffee per day may reduce progression of liver disease and development of liver cancer. If you have cirrhosis of the liver avoid raw shellfish such as oysters and clams. These can carry a specific bacteria that can be very harmful in liver disease    For more information on Fatty Liver Disease and Prevention please see this video from the [address]: Cyber-Rain.cy     For more information on healthy eating, please visit the American Heart Association website: TicketTuesday.uy. jsp     For tips on physical activity, please visit the American Heart Association website:   ConstitutionJournal.co.uk     If you are looking for additional local support, education or are ready to take action to end liver disease, contact the [employer]. The [employer] is the nation's leading nonprofit focusing on providing high-quality education and support services for the prevention, treatment and cure of over 100 liver diseases. You can learn more by visiting https://hannah.net/. org/midatlantic/

## 2023-08-15 NOTE — PROGRESS NOTES
[facility] - Outpatient Consultation  Roxana Sims 47 y.o. male MRN: [id number]  Encounter: [id number]    PCP:  Jefe Padilla, [phone]  Referring Provider:  Trenton Magaña, [address] [place] [address], [phone]    Patient: Roxana Sims, 1970  Reason for Referral: abnormal liver tests     ASSESSMENT/PLAN:  47 y.o. male with history of DM, hypothyroidism, HLD, class I obesity, and remote history of NHL s/p partial hepatectomy and XRT, radiation-induced hypothyroidism/hypotesteronemia who presents for initial evaluation for abnormal liver tests. He has no acute liver specific complaints or clinical evidence of hepatic decompensation. He has had fluctuating, mild elevations in serum transaminases with peak ALT in the 80s. His liver synthetic function and platelets are normal. He had a CT abdomen which showed severe hepatic steatosis. Suspect that his liver test abnormalities is due to NAFLD given his metabolic risk factors but would recommend a serologic work-up to exclude autoimmune disease given his family history of SLE. In addition, he may have progression of fatty liver disease in the context of testosterone use and multiple supplements. He does not have hemochromatosis given his negative HFE testing. We discussed natural history, prognosis, and complications of NAFLD, including progression to cirrhosis as well as risk for cardiovascular events. Would recommend an ultrasound elastography to exclude advanced fibrosis. We discussed that weight loss of at least 5-10% of his body weight is giles in preventing progression of his disease. If he is unsuccessful through lifestyle modification, would consider GLP1a therapy which has been shown to improve steatohepatitis and cause regression of fibrosis in patients with NAFLD. Otherwise, I recommend avoiding interval weight gain, excessive EtOH consumption and hepatotoxic medications (including all of his supplements).   He will return to clinic in 3 months or sooner if needed. Thank you for the opportunity to consult in his care. - VALENTINE, ASMA, AMA and quantitative immunoglobulins  - A1AT levels and phenotype  - HAV IgG, HBsAg, HBcAb, and HBsAb; vaccinate if non-immune  - US elastography     Delfino Rivera MD  Division of Gastroenterology and Hepatology  [address]    ============================================================================  CC/HPI: 47 y.o. male with history of DM, hypothyroidism, HLD, class I obesity, and remote history of NHL s/p partial hepatectomy and XRT, radiation-induced hypothyroidism/hypotesteronemia who presents for initial evaluation for abnormal liver tests. He has had transient elevated serum transaminases with ALT dating back to 2021 with peak ALT in the 80s. He had normalization of his liver tests in 2022 after he lost weight on the keto diet. However, he was told to stop his diet due to low testosterone which led to recent 30 lb weight gain. He also was started on IV testosterone injections 1 month ago. He also reports taking several herbal supplements since his hepatectomy. 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 He has a family history of lupus but otherwise denies history of liver disease. ROS: Complete review of systems otherwise negative.      PAST MEDICAL/SURGICAL HISTORY:  Past Medical History:   Diagnosis Date   • Allergic Seasonal   • Cancer ([address])    • Diabetes mellitus ([address])    • Disease of thyroid gland         Past Surgical History:   Procedure Laterality Date   • CATARACT EXTRACTION, BILATERAL     • EYE SURGERY  02/28/23 03/14/23    Cataract   • HERNIA REPAIR     • LIVER SURGERY     • LYMPH NODE BIOPSY  1996   • SPLENECTOMY, PARTIAL     • TONSILLECTOMY         FAMILY/SOCIAL HISTORY:  Family History   Problem Relation Age of Onset   • Diabetes Mother    • Cancer Mother    • Heart disease Mother    • Coronary artery disease Mother    • COPD Mother    • Arthritis Mother    • Diabetes type II Mother    • Heart disease Father    • Diabetes Father    • Cancer Father    • Coronary artery disease Father    • COPD Father    • Arthritis Father    • Diabetes type II Father    • Hypertension Father    • Hyperlipidemia Father    • Lupus Sister    • Autoimmune disease Sister         Lupus   • Cancer Sister         Melanoma   • Vision loss Maternal Grandfather    • Prostate cancer Paternal Grandfather    • Hearing loss Sister    • Diabetes type II Sister    • Bipolar disorder Brother    • Diabetes type II Brother    • Hypertension Brother        Social History     Tobacco Use   • Smoking status: Former     Packs/day: 2.00     Years: 25.00     Total pack years: 50.00     Types: Cigarettes     Start date: 1984     Quit date: 7/15/2010     Years since quittin.0     Passive exposure: Past   • Smokeless tobacco: Never   Vaping Use   • Vaping Use: Never used   Substance Use Topics   • Alcohol use: Not Currently   • Drug use: Not Currently       MEDICATIONS:  Current Outpatient Medications on File Prior to Visit   Medication Sig Dispense Refill   • ACAI BERRY PO 1,000 mg in the morning     • Acidophilus Lactobacillus CAPS      • Ascorbic Acid (Vitamin C) 500 MG CAPS      • Ashwagandha 500 MG CAPS      • B Complex Vitamins (B COMPLEX 100 PO)      • Blood Glucose Monitoring Suppl (OneTouch Verio) w/Device KIT Use in the morning 1 kit 0   • Leroy 3 MG CAPS      • Cholecalciferol (Vitamin D3) 125 MCG (5000 UT) CAPS      • Chromium Picolinate 500 MCG CAPS      • Coenzyme Q10 (CoQ10) 400 MG CAPS      • Garlic 7885 MG CAPS      • glucose blood (OneTouch Verio) test strip Use as instructed- 2x daily 200 each 1   • Lancets (onetouch ultrasoft) lancets Use as instructed- 2x day 200 each 1   • levothyroxine 150 mcg tablet Take 1 tablet (150 mcg total) by mouth daily 90 tablet 3   • Magnesium 400 MG CAPS      • Manganese 10 MG TABS      • metFORMIN (GLUCOPHAGE) 500 mg tablet Take 1 tablet (500 mg total) by mouth daily with dinner 90 tablet 1   • Milk Thistle 1000 MG CAPS Take 2,000 mg by mouth     • Multiple Vitamin (multivitamin) capsule Take 1 capsule by mouth daily     • Omega-3 Fatty Acids (fish oil) 1,000 mg Take 1,000 mg by mouth daily     • Potassium Citrate,Elemental K, 99 MG CAPS      • RA Aspirin EC 81 MG EC tablet Take 1 tablet (81 mg total) by mouth daily 90 tablet 1   • Saw Palmetto, Serenoa repens, (Saw [place] Berries) 540 MG CAPS      • SYRINGE-NEEDLE, DISP, 3 ML (BD Eclipse Syringe/Needle) 23G X 1-1/2" 3 ML MISC Use once a week 100 each 1   • Testosterone Cypionate 200 MG/ML SOLN Inject 100 mg as directed every 7 days 10 mL 1   • TURMERIC  mg in the morning     • Vitamin K 200 MCG/0.2ML LIQD      • Zinc Glycinate 20 MG CAPS      • prednisoLONE acetate (PRED FORTE) 1 % ophthalmic suspension instill 1 drop into left eye four times a day     • SYRINGE-NEEDLE, DISP, 5 ML 22G X 1" 5 ML MISC Use every 14 (fourteen) days 50 each 1   • Syringe/Needle, Disp, (SYRINGE 3CC/25GX1") 25G X 1" 3 ML MISC Use every 14 (fourteen) days 50 each 1     No current facility-administered medications on file prior to visit.        Allergies   Allergen Reactions   • Allyl Isothiocyanate Other (See Comments)   • Other Nasal Congestion     Mustard   • Keflex [Cephalexin] Rash       PHYSICAL EXAM:  /76 (BP Location: Left arm, Patient Position: Sitting)   Pulse 80   Ht 6' 1" (1.854 m)   Wt 109 kg (240 lb 12.8 oz)   BMI 31.77 kg/m²   GENERAL: NAD, AAO  HEENT: anicteric, OP clear, MMM  ABDOMEN: S/ND/NT, normoactive BS, no hepatomegaly, spleen not palpable  EXTREMITIES: no edema  SKIN: no rashes, no palmar erythema, no spider angiomata   NEURO: normal gait, no tremor, no asterixis     LABS/RADIOLOGY/ENDOSCOPY:  Lab Results   Component Value Date    WBC 8.01 04/03/2023    HGB 15.1 04/03/2023    HCT 44.1 04/03/2023     (H) 04/03/2023    GLUF 139 (H) 01/12/2023    BUN 11 06/12/2023    CREATININE 0.78 06/12/2023    K 4.5 06/12/2023    CL 98 06/12/2023    CO2 23 06/12/2023    ALKPHOS 87 01/12/2023    ALT 66 (H) 06/12/2023    AST 39 06/12/2023    GLOB 2.5 06/12/2023    CALCIUM 9.5 01/12/2023    EGFR 108 06/12/2023    TRIG 229 (H) 01/12/2023    HDL 45 01/12/2023     CT A/P with IV contrast (6/26/2023)  Severe diffuse fatty liver. No acute normality on CT abdomen. Additional findings as described above.     HCV Ab-  HBsAg-  HBcAb-  HIV-    VALENTINE-  Anti-dS-    Ferritin 400  HFE C282Y heterozygosity      Computed MELD 3.0 unavailable. Necessary lab results were not found in the last year. Computed MELD-Na unavailable. Necessary lab results were not found in the last year.

## 2023-08-24 ENCOUNTER — OFFICE VISIT (OUTPATIENT)
Dept: FAMILY MEDICINE CLINIC | Facility: CLINIC | Age: 53
End: 2023-08-24
Payer: COMMERCIAL

## 2023-08-24 VITALS
HEIGHT: 73 IN | OXYGEN SATURATION: 97 % | DIASTOLIC BLOOD PRESSURE: 70 MMHG | HEART RATE: 95 BPM | TEMPERATURE: 97.8 F | WEIGHT: 246 LBS | RESPIRATION RATE: 16 BRPM | SYSTOLIC BLOOD PRESSURE: 120 MMHG | BODY MASS INDEX: 32.6 KG/M2

## 2023-08-24 DIAGNOSIS — Z01.818 PREOP EXAMINATION: Primary | ICD-10-CM

## 2023-08-24 PROCEDURE — 99214 OFFICE O/P EST MOD 30 MIN: CPT | Performed by: NURSE PRACTITIONER

## 2023-08-25 PROBLEM — Z01.810 PREOP CARDIOVASCULAR EXAM: Status: RESOLVED | Noted: 2023-08-25 | Resolved: 2023-08-25

## 2023-08-25 PROBLEM — Z01.810 PREOP CARDIOVASCULAR EXAM: Status: ACTIVE | Noted: 2023-08-25

## 2023-08-25 NOTE — ASSESSMENT & PLAN NOTE
Patient is being seen for preop, has surgery scheduled to repair detached retina is going to [facility] in [place].

## 2023-08-25 NOTE — ASSESSMENT & PLAN NOTE
Is here for preop clearance. Patient has had retinal detachment. Has no vision in his eye at this point. He is getting it repaired at [facility] in [place]. He has to stop aspirin 3 days prior to surgery. Vital signs within normal limits  Patient had echo and Holter monitor done in June. Within normal limits. Patient is cleared for surgery.   Documentation completed

## 2023-08-25 NOTE — PROGRESS NOTES
Name: Tay Claudio      : 1970      MRN: [id number]  Encounter Provider: DONNA Phipps  Encounter Date: 2023   Encounter department: [facility]     1. Preop examination  Assessment & Plan:  Is here for preop clearance. Patient has had retinal detachment. Has no vision in his eye at this point. He is getting it repaired at [facility] in [place]. He has to stop aspirin 3 days prior to surgery. Vital signs within normal limits  Patient had echo and Holter monitor done in . Within normal limits. Patient is cleared for surgery. Documentation completed             Subjective      HPI  Review of Systems   Constitutional: Negative. HENT: Negative. Eyes: Positive for visual disturbance (no vision in left eye). Respiratory: Negative. Cardiovascular: Negative. Gastrointestinal: Negative. Endocrine: Negative. Genitourinary: Negative. Musculoskeletal: Negative. Skin: Negative. Allergic/Immunologic: Negative. Neurological: Negative. Hematological: Negative. Psychiatric/Behavioral: Negative.         Current Outpatient Medications on File Prior to Visit   Medication Sig   • ACAI BERRY PO 1,000 mg in the morning   • Acidophilus Lactobacillus CAPS    • Ascorbic Acid (Vitamin C) 500 MG CAPS    • Ashwagandha 500 MG CAPS    • B Complex Vitamins (B COMPLEX 100 PO)    • Blood Glucose Monitoring Suppl (OneTouch Verio) w/Device KIT Use in the morning   • Boron 3 MG CAPS    • Cholecalciferol (Vitamin D3) 125 MCG (5000 UT) CAPS    • Chromium Picolinate 500 MCG CAPS    • Coenzyme Q10 (CoQ10) 400 MG CAPS    • Garlic 1996 MG CAPS    • glucose blood (OneTouch Verio) test strip Use as instructed- 2x daily   • Lancets (onetouch ultrasoft) lancets Use as instructed- 2x day   • levothyroxine 150 mcg tablet Take 1 tablet (150 mcg total) by mouth daily   • Magnesium 400 MG CAPS    • Manganese 10 MG TABS    • metFORMIN (GLUCOPHAGE) 500 mg tablet Take 1 tablet (500 mg total) by mouth daily with dinner   • Milk Thistle 1000 MG CAPS Take 2,000 mg by mouth   • Multiple Vitamin (multivitamin) capsule Take 1 capsule by mouth daily   • Omega-3 Fatty Acids (fish oil) 1,000 mg Take 1,000 mg by mouth daily   • Potassium Citrate,Elemental K, 99 MG CAPS    • RA Aspirin EC 81 MG EC tablet Take 1 tablet (81 mg total) by mouth daily   • Saw Palmetto, Serenoa repens, (Saw [place] Berries) 540 MG CAPS    • SYRINGE-NEEDLE, DISP, 3 ML (BD Eclipse Syringe/Needle) 23G X 1-1/2" 3 ML MISC Use once a week   • Testosterone Cypionate 200 MG/ML SOLN Inject 100 mg as directed every 7 days   • TURMERIC  mg in the morning   • Vitamin K 200 MCG/0.2ML LIQD    • Zinc Glycinate 20 MG CAPS    • prednisoLONE acetate (PRED FORTE) 1 % ophthalmic suspension instill 1 drop into left eye four times a day   • SYRINGE-NEEDLE, DISP, 5 ML 22G X 1" 5 ML MISC Use every 14 (fourteen) days   • Syringe/Needle, Disp, (SYRINGE 3CC/25GX1") 25G X 1" 3 ML MISC Use every 14 (fourteen) days       Objective     /70   Pulse 95   Temp 97.8 °F (36.6 °C) (Temporal)   Resp 16   Ht 6' 1" (1.854 m)   Wt 112 kg (246 lb)   SpO2 97%   BMI 32.46 kg/m²     Physical Exam  Vitals and nursing note reviewed. Constitutional:       Appearance: He is well-developed. He is obese. HENT:      Head: Normocephalic and atraumatic. Eyes:      Comments: No vision in left eye     Cardiovascular:      Rate and Rhythm: Normal rate and regular rhythm. Pulses: Normal pulses. Heart sounds: Normal heart sounds. Pulmonary:      Effort: Pulmonary effort is normal.      Breath sounds: Normal breath sounds. Abdominal:      Palpations: Abdomen is soft. Musculoskeletal:         General: Normal range of motion. Cervical back: Normal range of motion. Skin:     General: Skin is warm and dry. Neurological:      General: No focal deficit present.       Mental Status: He is alert and oriented to person, place, and time. Psychiatric:         Mood and Affect: Mood normal.         Behavior: Behavior normal.         Thought Content:  Thought content normal.         Judgment: Judgment normal.       DONNA Juárez

## 2023-08-26 LAB
A1AT PHENOTYP SERPL IFE: NORMAL
A1AT SERPL-MCNC: 151 MG/DL (ref 101–187)
ANA TITR SER IF: NEGATIVE {TITER}
ANTI-SMOOTH MUSCLE AB BY IFA: NORMAL
HAV AB SER QL IA: NEGATIVE
HBV CORE AB SERPL QL IA: NEGATIVE
HBV CORE IGM SERPL QL IA: NEGATIVE
HBV SURFACE AB SER QL: NON REACTIVE
IGA SERPL-MCNC: 157 MG/DL (ref 90–386)
IGG SERPL-MCNC: 925 MG/DL (ref 603–1613)
IGM SERPL-MCNC: 24 MG/DL (ref 20–172)
MITOCHONDRIA M2 IGG SER-ACNC: <20 UNITS (ref 0–20)

## 2023-08-29 ENCOUNTER — TELEPHONE (OUTPATIENT)
Dept: GASTROENTEROLOGY | Facility: CLINIC | Age: 53
End: 2023-08-29

## 2023-08-30 ENCOUNTER — TELEPHONE (OUTPATIENT)
Dept: FAMILY MEDICINE CLINIC | Facility: CLINIC | Age: 53
End: 2023-08-30

## 2023-08-30 NOTE — TELEPHONE ENCOUNTER
Patient informed and is not interested in any vaccines. ----- Message from Naomi Gonzalez DO sent at 8/30/2023 11:33 AM EDT -----  Do we carry these vaccines? If, so please schedule for nurses visit. Thanks  ----- Message -----  From: Doron Caldwell MD  Sent: 8/30/2023  11:12 AM EDT  To: Naomi Gonzalez DO    Pt updated by [facility].  Needs HAV and HBV vaccination given non-immunity

## 2023-09-05 ENCOUNTER — TELEPHONE (OUTPATIENT)
Dept: ENDOCRINOLOGY | Facility: HOSPITAL | Age: 53
End: 2023-09-05

## 2023-09-05 DIAGNOSIS — E23.0 GROWTH HORMONE DEFICIENCY (HCC): Primary | ICD-10-CM

## 2023-09-05 NOTE — TELEPHONE ENCOUNTER
Please change dates on the Glucagon stim test to 9/18/2023 at 10 am      ----- Message from [address] [place] [address] sent at 9/5/2023  4:02 PM EDT -----  Pt states ok for every day but Fridays and not available on 9/8 or 9/15  ----- Message -----  From: [address] [place] [address]  Sent: 8/24/2023   4:44 PM EDT  To: Sahra Pat    Pt prefers [facility]  ----- Message -----  From: Bib Thompson MD  Sent: 7/21/2023   1:13 PM EDT  To: Sahra Pat    Glucagon stim testing ordered

## 2023-09-08 ENCOUNTER — HOSPITAL ENCOUNTER (OUTPATIENT)
Dept: ULTRASOUND IMAGING | Facility: HOSPITAL | Age: 53
End: 2023-09-08
Attending: STUDENT IN AN ORGANIZED HEALTH CARE EDUCATION/TRAINING PROGRAM
Payer: COMMERCIAL

## 2023-09-08 DIAGNOSIS — K76.0 NON-ALCOHOLIC FATTY LIVER DISEASE: ICD-10-CM

## 2023-09-08 DIAGNOSIS — R79.89 ABNORMAL LIVER FUNCTION TESTS: ICD-10-CM

## 2023-09-08 PROCEDURE — 76981 USE PARENCHYMA: CPT

## 2023-09-13 LAB
ALBUMIN SERPL-MCNC: 5 G/DL (ref 3.8–4.9)
ALBUMIN/CREAT UR: 12 MG/G CREAT (ref 0–29)
ALBUMIN/GLOB SERPL: 2.1 {RATIO} (ref 1.2–2.2)
ALP SERPL-CCNC: 82 IU/L (ref 44–121)
ALT SERPL-CCNC: 24 IU/L (ref 0–44)
AST SERPL-CCNC: 23 IU/L (ref 0–40)
BASOPHILS # BLD AUTO: 0.1 X10E3/UL (ref 0–0.2)
BASOPHILS NFR BLD AUTO: 1 %
BILIRUB SERPL-MCNC: 0.2 MG/DL (ref 0–1.2)
BUN SERPL-MCNC: 13 MG/DL (ref 6–24)
BUN/CREAT SERPL: 14 (ref 9–20)
CALCIUM SERPL-MCNC: 10.4 MG/DL (ref 8.7–10.2)
CHLORIDE SERPL-SCNC: 100 MMOL/L (ref 96–106)
CHOLEST SERPL-MCNC: 274 MG/DL (ref 100–199)
CO2 SERPL-SCNC: 22 MMOL/L (ref 20–29)
CREAT SERPL-MCNC: 0.91 MG/DL (ref 0.76–1.27)
CREAT UR-MCNC: 216.4 MG/DL
CYTOLOGY CMNT CVX/VAG CYTO-IMP: ABNORMAL
EGFR: 102 ML/MIN/1.73
EOSINOPHIL # BLD AUTO: 0.2 X10E3/UL (ref 0–0.4)
EOSINOPHIL NFR BLD AUTO: 2 %
ERYTHROCYTE [DISTWIDTH] IN BLOOD BY AUTOMATED COUNT: 15.3 % (ref 11.6–15.4)
GH SERPL-MCNC: 0.3 NG/ML (ref 0–10)
GLOBULIN SER-MCNC: 2.4 G/DL (ref 1.5–4.5)
GLUCOSE SERPL-MCNC: 98 MG/DL (ref 70–99)
HBA1C MFR BLD: 5.9 % (ref 4.8–5.6)
HCT VFR BLD AUTO: 54.5 % (ref 37.5–51)
HDLC SERPL-MCNC: 37 MG/DL
HGB BLD-MCNC: 18.3 G/DL (ref 13–17.7)
IGF-I SERPL-MCNC: 64 NG/ML (ref 74–255)
IMM GRANULOCYTES # BLD: 0 X10E3/UL (ref 0–0.1)
IMM GRANULOCYTES NFR BLD: 0 %
LDLC SERPL CALC-MCNC: 209 MG/DL (ref 0–99)
LYMPHOCYTES # BLD AUTO: 1.9 X10E3/UL (ref 0.7–3.1)
LYMPHOCYTES NFR BLD AUTO: 21 %
MCH RBC QN AUTO: 33.5 PG (ref 26.6–33)
MCHC RBC AUTO-ENTMCNC: 33.6 G/DL (ref 31.5–35.7)
MCV RBC AUTO: 100 FL (ref 79–97)
MICROALBUMIN UR-MCNC: 26 UG/ML
MONOCYTES # BLD AUTO: 0.9 X10E3/UL (ref 0.1–0.9)
MONOCYTES NFR BLD AUTO: 10 %
NEUTROPHILS # BLD AUTO: 6.1 X10E3/UL (ref 1.4–7)
NEUTROPHILS NFR BLD AUTO: 66 %
PLATELET # BLD AUTO: 367 X10E3/UL (ref 150–450)
POTASSIUM SERPL-SCNC: 4.7 MMOL/L (ref 3.5–5.2)
PROT SERPL-MCNC: 7.4 G/DL (ref 6–8.5)
PSA SERPL-MCNC: 1.4 NG/ML (ref 0–4)
RBC # BLD AUTO: 5.46 X10E6/UL (ref 4.14–5.8)
SL AMB VLDL CHOLESTEROL CALC: 28 MG/DL (ref 5–40)
SODIUM SERPL-SCNC: 138 MMOL/L (ref 134–144)
TESTOST FREE SERPL-MCNC: 13.4 PG/ML (ref 7.2–24)
TESTOST SERPL-MCNC: 832.1 NG/DL (ref 264–916)
TRIGL SERPL-MCNC: 150 MG/DL (ref 0–149)
WBC # BLD AUTO: 9.2 X10E3/UL (ref 3.4–10.8)

## 2023-09-14 ENCOUNTER — TELEPHONE (OUTPATIENT)
Dept: INFUSION CENTER | Facility: CLINIC | Age: 53
End: 2023-09-14

## 2023-09-14 NOTE — TELEPHONE ENCOUNTER
New patient phone call  S/w patient regarding his upcoming appt. Went over that he needs to be fasting since midnight and laying down the whole alvaro he is here. Aware of the test and policies for our building. Will call if any questions arise.

## 2023-09-15 ENCOUNTER — OFFICE VISIT (OUTPATIENT)
Dept: ENDOCRINOLOGY | Facility: HOSPITAL | Age: 53
End: 2023-09-15
Payer: COMMERCIAL

## 2023-09-15 VITALS
HEIGHT: 73 IN | WEIGHT: 239.6 LBS | HEART RATE: 90 BPM | DIASTOLIC BLOOD PRESSURE: 70 MMHG | BODY MASS INDEX: 31.75 KG/M2 | SYSTOLIC BLOOD PRESSURE: 128 MMHG | OXYGEN SATURATION: 98 %

## 2023-09-15 DIAGNOSIS — E03.9 HYPOTHYROIDISM, UNSPECIFIED TYPE: Primary | ICD-10-CM

## 2023-09-15 DIAGNOSIS — E34.9 TESTOSTERONE DEFICIENCY: ICD-10-CM

## 2023-09-15 DIAGNOSIS — E78.2 MIXED HYPERLIPIDEMIA: ICD-10-CM

## 2023-09-15 DIAGNOSIS — E11.65 TYPE 2 DIABETES MELLITUS WITH HYPERGLYCEMIA, WITHOUT LONG-TERM CURRENT USE OF INSULIN (HCC): ICD-10-CM

## 2023-09-15 DIAGNOSIS — E23.0 HYPOPITUITARISM (HCC): ICD-10-CM

## 2023-09-15 DIAGNOSIS — E23.0 GROWTH HORMONE DEFICIENCY (HCC): ICD-10-CM

## 2023-09-15 PROCEDURE — 99215 OFFICE O/P EST HI 40 MIN: CPT | Performed by: STUDENT IN AN ORGANIZED HEALTH CARE EDUCATION/TRAINING PROGRAM

## 2023-09-15 RX ORDER — TESTOSTERONE CYPIONATE 200 MG/ML
50 VIAL (ML) INTRAMUSCULAR
Qty: 10 ML | Refills: 1 | Status: SHIPPED | OUTPATIENT
Start: 2023-09-15

## 2023-09-15 NOTE — PROGRESS NOTES
9/15/2023    Assessment/Plan        Problem List Items Addressed This Visit        Endocrine    Hypothyroidism - Primary    Relevant Orders    Hemoglobin A1C    Comprehensive metabolic panel    Albumin / creatinine urine ratio    Lipid panel    Vitamin D 25 hydroxy    PTH, intact    Testosterone, free, total    CBC and differential- Lab Collect    Growth hormone    Insulin-like growth factor 1 (IGF-1) Lab Collect    T4, free    TSH, 3rd generation    Hypopituitarism (HCC)    Relevant Orders    Hemoglobin A1C    Comprehensive metabolic panel    Albumin / creatinine urine ratio    Lipid panel    Vitamin D 25 hydroxy    PTH, intact    Testosterone, free, total    CBC and differential- Lab Collect    Growth hormone    Insulin-like growth factor 1 (IGF-1) Lab Collect    Type 2 diabetes mellitus with hyperglycemia, without long-term current use of insulin (HCC)    Relevant Orders    Hemoglobin A1C    Comprehensive metabolic panel    Albumin / creatinine urine ratio    Lipid panel    Vitamin D 25 hydroxy    PTH, intact    Testosterone, free, total    CBC and differential- Lab Collect    Growth hormone    Insulin-like growth factor 1 (IGF-1) Lab Collect    Growth hormone deficiency (HCC)    Relevant Orders    Hemoglobin A1C    Comprehensive metabolic panel    Albumin / creatinine urine ratio    Lipid panel    Vitamin D 25 hydroxy    PTH, intact    Testosterone, free, total    CBC and differential- Lab Collect    Growth hormone    Insulin-like growth factor 1 (IGF-1) Lab Collect       Other    Testosterone deficiency    Relevant Medications    Testosterone Cypionate 200 MG/ML SOLN    Mixed hyperlipidemia    Relevant Orders    Hemoglobin A1C    Comprehensive metabolic panel    Albumin / creatinine urine ratio    Lipid panel    Vitamin D 25 hydroxy    PTH, intact    Testosterone, free, total    CBC and differential- Lab Collect    Growth hormone    Insulin-like growth factor 1 (IGF-1) Lab Collect Assessment/Plan:  Patient is a 51yM with PMHx of hypothyroidism and secondary hypogonadism unclear etiology- ?radiation induced while tx of lymphoma, with other PMHx of t2DM, hyperlipidemia, class 2 obesity, NAFLD who presents today for follow up. Last visit 07/23    1) Hypothyroidism- given normal TSH and not low, discussed has primary hypothyroidism. Currently thyroid based on labs and clinically. C/w levothyroxine 150mcg daily, repeat labs in 3 months    2) Hypogonadism- given low LH/FSH concerned about secondary hypogonadism, most likely d/t iatrogenic testosterone use. Unfortunately since on T replacement for long time now, has secondary hypogonadism. MRI pit neg, genetic issues cannot be ruled out. Feeling much better since switching to IM testosterone than gel. However most recent testosterone level slightly on ULN and also HCT high >54 and hence would recommend reducing dose to 50mg weekly. Repeat labs in 3 months for follow up. Previous regime every 2 weeks was not lasting long enough. Patient asked about switching back to patch, but will wait on this given multiple other issues       2) Diabetes:- Patients HbA1C is elevated at 9.7%, Pancreatic ca, FELIX and cushing ruled out. Most likely has T2DM at this time. C peptide normal. BG and HbA1C now improved to prediabetic range 5.9% with diet and also metformin. Having diarrhea on metformin, ok to stop this for now and continue with diet changes and monitor BG, if starts to raise resume metformin or can consider ozempic in future   Follows up with ophthalmology regularly  Uptodate on lipid/urine       3) Hyperlipidemia:- patient currently not on any medication, LDL and TG above goal, possible d/t testosterone effect.  Ok to continue with milk thisle and if control still abnormal in future after improved T level consider red rice yeast. Myalgia on zetia and statin in past    4) GH def:- Patient does have issues with obesity however given hypogonadism and hypothyroidism will be 100% and obtain glucagon stim test to rule out [address] [place] def specially since reporting weight gain and fatigue which I discussed is still non specific and can be multifactorial. Will have to review Pro's Con of [address] [place] replacement if/when starting this. Patient did have normal pit scan in the past with rest of Ant pit hormone panel neg with normal TSH, cortisol, ACTH, Prolactin, LH/FSH low d/t T use. If [address] [place] level >1.0 with gluagon stim rules out [address] [place] def. Given his BMI >30. Has stopped most supplements, discussed ok to resume ashwagandha to see if help with natural androgen production specially since he reports some testicular shrinkage on T replacement which is known side effect    RTC in 3 months     I have spent a total time of 40 minutes on 09/15/23 in caring for this patient including Diagnostic results, Prognosis, Risks and benefits of tx options, Instructions for management, Patient and family education, Impressions and Obtaining or reviewing history     CC: panhypopituitarism, diabetes    History of Present Illness     HPI: Jon Bhat is a 47y.o. year old male with history of lymphoma in 1996 tx with radiation and chemotherapy- neck to pelvis who was then diagnosed with hypothyroidism in 2009 on levothyroxine and also hypogonadism on testosterone replacement. Other PMHx of ?hemachromatosis, other PMHx of T2DM, hyperlipidemia and class 1 obesity, NAFLD, and low IGF who presents today for follow up    Hypogonadism:- previously on androgel with stable control for years but started to have low T levels since last year and hence switch to IM. currently on IM testosterone 100mg weekly. Most recent testosterone level 832.1 with total 13.4, Hct 54. 5. and Lipid profile abnormal at , . Patient currently feel like he is up all night. Libido is good but reports does still feel tired at times.      Hypothyroidism:- patient currently on 150mcg levothyroxine daily, most recent TSH and Free t4 normal 06/23. Feels better, does have heat intolerance all the time but not currently. Unable to lose weight, denies weight loss/gain, denies constipation, diarrhea, hair loss    Diabetes:- patient reports previously diabetes was under control. Previously on metformin, reports was on keto diet and lost weight and diabetes was under control. Gained weight and diabetes decline since stopping keto with HbA1C of 9.7%, given rapid progression Pancreatic Ca ruled out with CT abdomen (did show fatty liver disease), cushing ruled out with normal ACTH and cortisol and FELIX also ruled out. Medication-  Started on metformin 500mg daily  Checks BG 2x daily- Avg 142, in range 68% , 32% >180 SINCE 06/23 however recent BG data all <180. Eye:- has retinal dettachment and surgery 06/23, follows ophthalmology at [facility] closely. Last foot exam- 06/23  Most recent HbA1C 5.9% from 9.7%  Diet- eating modified keto with farm raised meat, eggs and purified butter. Hyperlipidemia:- elevated level in past and wanted to work on lifestyle changes 01/23. , 225 cholestrol, 164 LDL. Recent labs since working on lifestyle- , , Cholestrol 274. Low GH:- Patient previously found to have low IGF-1 level 55 03/23 repeat, IGF 34 with GH 0.1. Scheduled for gluacgon stim test for provocative testing given uncertainly regarding Dx which he is scheduled for in 1 week. Family hx and social hx as below     Review of Systems   Constitutional: Positive for fatigue and unexpected weight change. Negative for diaphoresis. Respiratory: Negative for shortness of breath. Cardiovascular: Negative for chest pain and palpitations. Gastrointestinal: Negative for constipation and diarrhea. Endocrine: Negative for cold intolerance, polydipsia and polyuria. Musculoskeletal: Negative for arthralgias and myalgias. Neurological: Negative. Psychiatric/Behavioral: Negative.         Historical Information   Past Medical History:   Diagnosis Date   • Allergic Seasonal   • Cancer ([address])    • Diabetes mellitus ([address])    • Disease of thyroid gland      Past Surgical History:   Procedure Laterality Date   • CATARACT EXTRACTION, BILATERAL     • EYE SURGERY  23    Cataract   • HERNIA REPAIR     • LIVER SURGERY     • LYMPH NODE BIOPSY     • SPLENECTOMY, PARTIAL     • TONSILLECTOMY       Social History   Social History     Substance and Sexual Activity   Alcohol Use Not Currently     Social History     Substance and Sexual Activity   Drug Use Not Currently     Social History     Tobacco Use   Smoking Status Former   • Packs/day: 2.00   • Years: 25.00   • Total pack years: 50.00   • Types: Cigarettes   • Start date: 1984   • Quit date: 7/15/2010   • Years since quittin.1   • Passive exposure: Past   Smokeless Tobacco Never     Family History:   Family History   Problem Relation Age of Onset   • Diabetes Mother    • Cancer Mother    • Heart disease Mother    • Coronary artery disease Mother    • COPD Mother    • Arthritis Mother    • Diabetes type II Mother    • Heart disease Father    • Diabetes Father    • Cancer Father    • Coronary artery disease Father    • COPD Father    • Arthritis Father    • Diabetes type II Father    • Hypertension Father    • Hyperlipidemia Father    • Lupus Sister    • Autoimmune disease Sister         Lupus   • Cancer Sister         Melanoma   • Vision loss Maternal Grandfather    • Prostate cancer Paternal Grandfather    • Hearing loss Sister    • Diabetes type II Sister    • Bipolar disorder Brother    • Diabetes type II Brother    • Hypertension Brother        Meds/Allergies   Current Outpatient Medications   Medication Sig Dispense Refill   • Acidophilus Lactobacillus CAPS      • Blood Glucose Monitoring Suppl (OneTouch Verio) w/Device KIT Use in the morning 1 kit 0   • Cholecalciferol (Vitamin D3) 125 MCG (5000 UT) CAPS      • glucose blood (OneTouch Verio) test strip Use as instructed- 2x daily 200 each 1   • Lancets (onetouch ultrasoft) lancets Use as instructed- 2x day 200 each 1   • levothyroxine 150 mcg tablet Take 1 tablet (150 mcg total) by mouth daily 90 tablet 3   • Magnesium 400 MG CAPS      • Milk Thistle 1000 MG CAPS Take 2,000 mg by mouth     • Multiple Vitamin (multivitamin) capsule Take 1 capsule by mouth daily     • Omega-3 Fatty Acids (fish oil) 1,000 mg Take 1,000 mg by mouth daily     • Potassium Citrate,Elemental K, 99 MG CAPS      • prednisoLONE acetate (PRED FORTE) 1 % ophthalmic suspension instill 1 drop into left eye four times a day     • RA Aspirin EC 81 MG EC tablet Take 1 tablet (81 mg total) by mouth daily 90 tablet 1   • Saw Palmetto, Serenoa repens, (Saw [place]ries) 540 MG CAPS      • SYRINGE-NEEDLE, DISP, 3 ML (BD Eclipse Syringe/Needle) 23G X 1-1/2" 3 ML MISC Use once a week 100 each 1   • Testosterone Cypionate 200 MG/ML SOLN Inject 50 mg as directed every 7 days 10 mL 1   • Ashwagandha 500 MG CAPS  (Patient not taking: Reported on 9/15/2023)     • SYRINGE-NEEDLE, DISP, 5 ML 22G X 1" 5 ML MISC Use every 14 (fourteen) days 50 each 1   • Syringe/Needle, Disp, (SYRINGE 3CC/25GX1") 25G X 1" 3 ML MISC Use every 14 (fourteen) days 50 each 1     No current facility-administered medications for this visit. Allergies   Allergen Reactions   • Allyl Isothiocyanate Other (See Comments)   • Other Nasal Congestion     Mustard   • Keflex [Cephalexin] Rash       Objective   Vitals: Blood pressure 128/70, pulse 90, height 6' 1" (1.854 m), weight 109 kg (239 lb 9.6 oz), SpO2 98 %. Invasive Devices     None               Body mass index is 31.61 kg/m².   /70   Pulse 90   Ht 6' 1" (1.854 m)   Wt 109 kg (239 lb 9.6 oz)   SpO2 98%   BMI 31.61 kg/m²    Wt Readings from Last 3 Encounters:   09/15/23 109 kg (239 lb 9.6 oz)   08/24/23 112 kg (246 lb)   08/15/23 109 kg (240 lb 12.8 oz)       GEN: NAD  E/n/m nl facies, hearing intact bilat, tongue midline, lips nl  Eyes: no stare or proptosis, nl lids and conjunctiva, EOMI  Neck: trachea midline, thyroid NT to palpation, nl in size, no nodules or neck masses noted, no cervical LAD  CV; heart reg rate s1s2 nl, no m/r/g appreciated, no LINDA  Resp: CTAB, good effort  Ab+BS  Neuro: no tremor, 2+ DTRs in BUE  MS: no c/c in digits, moves all 4 ext, nl muscle bulk, gait nl  Skin: warm and dry, no palmar erythema  Ext: no c/c in digits, no edema bilaterally, 2+ DP and PT pulses bilat, no breaks in skin/ulcers on feet, sensation intact to monofilament testing on plantar surfaces bilat  Psych: nl mood and affect, no gross lapses in memory    Physical Exam  Constitutional:       Appearance: Normal appearance. He is obese. Cardiovascular:      Rate and Rhythm: Normal rate. Heart sounds: Murmur heard. Pulmonary:      Effort: Pulmonary effort is normal.   Abdominal:      General: Abdomen is flat. Bowel sounds are normal.      Palpations: Abdomen is soft. Musculoskeletal:      Cervical back: Normal range of motion. Skin:     General: Skin is warm. Capillary Refill: Capillary refill takes less than 2 seconds. Neurological:      General: No focal deficit present. Mental Status: He is alert. Psychiatric:         Mood and Affect: Mood normal.         The history was obtained from the review of the chart and from the patient.     Lab Results:      Latest Reference Range & Units 09/08/23 08:26   GROWTH HORMONE 0.0 - 10.0 ng/mL 0.3   INSULIN-LIKE GROWTH FACTOR-1 74 - 255 ng/mL 64 (L)   Hemoglobin A1C 4.8 - 5.6 % 5.9 (H)   Testosterone, Total, LC/.0 - 916.0 ng/dL 832.1   TESTOSTERONE FREE 7.2 - 24.0 pg/mL 13.4   CALCIUM 8.7 - 10.2 mg/dL 10.4 (H)   (L): Data is abnormally low  (H): Data is abnormally high      Latest Reference Range & Units 06/20/23 08:33 06/29/23 08:57   ACTH 7.2 - 63.3 pg/mL  17.3   GROWTH HORMONE 0.0 - 10.0 ng/mL 0.1    INSULIN-LIKE GROWTH FACTOR-1 74 - 255 ng/mL 34 (L)    (L): Data is abnormally low     Latest Reference Range & Units 06/20/23 08:33   IA-2 AUTOANTIBODIES U/mL <7.5      Latest Reference Range & Units 06/20/23 08:33 06/29/23 08:57   C-PEPTIDE 1.1 - 4.4 ng/mL 2.5    TSH, POC 0.450 - 4.500 uIU/mL  1.700   Free T4 0.82 - 1.77 ng/dL 1.40       Latest Reference Range & Units 06/27/23 11:11 09/08/23 08:26   EXT Creatinine Urine Not Estab. mg/dL 131.0 216.4   MICROALBUMIN/CREATININE RATIO 0 - 29 mg/g creat 33 (H) 12   MICROALBUM.,U,RANDOM Not Estab. ug/mL 43.5 (H) 26.0   (H): Data is abnormally high     Latest Reference Range & Units 06/29/23 08:57   Cortisol AM 6.2 - 19.4 ug/dL 12.5      Latest Reference Range & Units 01/12/23 14:30 09/08/23 08:26   Cholesterol 100 - 199 mg/dL 255 (H) 274 (H)   Triglycerides 0 - 149 mg/dL 229 (H) 150 (H)   HDL >39 mg/dL 45 37 (L)   Non-HDL Cholesterol mg/dl 210    LDL Calculated 0 - 99 mg/dL 164 (H) 209 (H)   VLDL Cholesterol Musa 5 - 40 mg/dL  28   (H): Data is abnormally high  (L): Data is abnormally low      Component 06/06/2022 06/06/2022 11/22/2021 11/22/2021 04/14/2021 04/14/2021            Direct Low Density Lipoprotein (d-LDL) 186 High      -- 198 High      -- 171 High      --   Cholesterol -- 240 High     -- 244 High     -- 206 High       Triglycerides -- 116 -- 145 -- 78   HDL -- 39 Low     -- 50 -- 41   LDL Calculated -- 178 High     -- 165 High     -- 149 High       Cholesterol:HDL Ratio -- 6.2 High     -- 4.9          Latest Reference Range & Units 01/12/23 14:30 06/12/23 08:29   Hemoglobin A1C 4.8 - 5.6 % 6.2 (H) 9.7 (H)   eAG, EST AVG Glucose mg/dl 131    (H): Data is abnormally high     Latest Reference Range & Units 04/07/23 08:20 04/21/23 08:40   ACTH 7.2 - 63.3 pg/mL See written report from [facility] 29.4   Cortisol - AM 4.2 - 22.4 ug/dL  20.5      Latest Reference Range & Units 03/30/23 08:39 04/06/23 07:52   INSULIN-LIKE GROWTH FACTOR-1 74 - 255 ng/mL 55 (L)    LUTEINIZING HORMONE 1.2 - 10.6 mIU/mL <0.2 (L)    FSH, POC 0.7 - 10.8 mIU/mL <0.2 (L)    PROLACTIN 2.5 - 17.4 ng/mL  12.8   (L): Data is abnormally low   Latest Reference Range & Units 01/12/23 14:30 02/02/23 11:36 03/03/23 10:35 03/30/23 08:39 05/18/23 08:15 09/08/23 08:26   Testosterone, Total, LC/.0 - 916.0 ng/dL 89 (L) 228 (L) 156 (L) 61 (L) 207 (L) 832.1   TESTOSTERONE FREE 7.2 - 24.0 pg/mL 4.7 (L) 11.9 5.8 (L) 5.4 (L) 9.3 13.4   (L): Data is abnormally low     9/8/23  8:26 AM    White Blood Cell Count 3.4 - 10.8 x10E3/uL 9.2    Red Blood Cell Count 4.14 - 5.80 x10E6/uL 5.46    Hemoglobin 13.0 - 17.7 g/dL 18.3 High     HCT 37.5 - 51.0 % 54. 5 High     MCV 79 - 97 fL 100 High     MCH 26.6 - 33.0 pg 33.5 High     MCHC 31.5 - 35.7 g/dL 33.6    RDW 11.6 - 15.4 % 15.3    Platelet Count 408 - 450 x10E3/uL 367    Neutrophils Not Estab. % 66    Lymphocytes Not Estab. % 21    Monocytes Not Estab. % 10    Eosinophils Not Estab. % 2    Basophils PCT Not Estab. % 1    Neutrophils (Absolute) 1.4 - 7.0 x10E3/uL 6.1    Lymphocytes (Absolute) 0.7 - 3.1 x10E3/uL 1.9    Monocytes (Absolute) 0.1 - 0.9 x10E3/uL 0.9    Eosinophils (Absolute) 0.0 - 0.4 x10E3/uL 0.2    Basophils ABS 0.0 - 0.2 x10E3/uL 0.1    Immature Granulocytes Not Estab. % 0    Immature Granulocytes (Absolute) 0.0 - 0.1 x10E3/uL 0.0        MRI BRAIN AND SELLA  WITH AND WITHOUT CONTRAST     INDICATION:  E23.0: Hypopituitarism     COMPARISON: None.     TECHNIQUE:  Multiplanar, multisequence imaging of the brain and sella was performed before and after gadolinium administration.     Targeted images of the sella were performed requiring additional time at acquisition and interpretation of approximately 25%     IV Contrast:  10 mL of Gadobutrol injection (SINGLE-DOSE)     IMAGE QUALITY:  Diagnostic.     FINDINGS:     BRAIN PARENCHYMA:  There is no discrete mass, mass effect or midline shift. Brainstem and cerebellum demonstrate normal signal. There is no intracranial hemorrhage.   There is no evidence of acute infarction and diffusion imaging is unremarkable. There   are no white matter changes in the cerebral hemispheres. Normal postcontrast imaging.     VENTRICLES:  Normal for the patient's age.     SELLA AND PITUITARY GLAND:  The gland is homogeneous and normal in size. The pituitary stalk is midline. Normal parasellar and suprasellar structures.     ORBITS:  Normal.     PARANASAL SINUSES: There is a pedunculated polyp identified within the midportion of the left maxillary sinus which appears to arise from the anterior wall of the sinus.  Mild mucosal thickening within the right maxillary sinus.     VASCULATURE:  Evaluation of the major intracranial vasculature demonstrates appropriate flow voids.     CALVARIUM AND SKULL BASE:  Normal.     EXTRACRANIAL SOFT TISSUES:  Normal.     IMPRESSION:     Normal MRI of the brain.     Dedicated imaging of the pituitary gland appears unremarkable.     Future Appointments   Date Time Provider [address]   9/18/2023  8:00 AM MO INF BED 13 MO Infusion [facility]   9/28/2023  1:00 PM AN POC STRESS 1 AN Poc [facility] AN POC H&V   10/5/2023 11:20 AM Clinton Almonte DO [facility] POCO  [facility]   11/6/2023 12:30 PM Gia Traore MD [facility]   1/15/2024 11:00 AM Mariama Jernigan DO [facility]

## 2023-09-18 ENCOUNTER — HOSPITAL ENCOUNTER (OUTPATIENT)
Dept: INFUSION CENTER | Facility: CLINIC | Age: 53
Discharge: HOME | End: 2023-09-18
Payer: COMMERCIAL

## 2023-09-18 VITALS
TEMPERATURE: 98 F | SYSTOLIC BLOOD PRESSURE: 128 MMHG | DIASTOLIC BLOOD PRESSURE: 70 MMHG | HEART RATE: 90 BPM | RESPIRATION RATE: 18 BRPM

## 2023-09-18 DIAGNOSIS — E23.0 GROWTH HORMONE DEFICIENCY (HCC): Primary | ICD-10-CM

## 2023-09-18 LAB
GLUCOSE P FAST SERPL-MCNC: 105 MG/DL (ref 65–99)
GLUCOSE P FAST SERPL-MCNC: 115 MG/DL (ref 65–99)
GLUCOSE P FAST SERPL-MCNC: 119 MG/DL (ref 65–99)
GLUCOSE P FAST SERPL-MCNC: 130 MG/DL (ref 65–99)
GLUCOSE P FAST SERPL-MCNC: 145 MG/DL (ref 65–99)
GLUCOSE P FAST SERPL-MCNC: 160 MG/DL (ref 65–99)
GLUCOSE P FAST SERPL-MCNC: 172 MG/DL (ref 65–99)
GLUCOSE P FAST SERPL-MCNC: 189 MG/DL (ref 65–99)

## 2023-09-18 PROCEDURE — 83003 ASSAY GROWTH HORMONE (HGH): CPT | Performed by: STUDENT IN AN ORGANIZED HEALTH CARE EDUCATION/TRAINING PROGRAM

## 2023-09-18 PROCEDURE — 82947 ASSAY GLUCOSE BLOOD QUANT: CPT | Performed by: STUDENT IN AN ORGANIZED HEALTH CARE EDUCATION/TRAINING PROGRAM

## 2023-09-18 RX ADMIN — GLUCAGON 1 MG: KIT at 08:23

## 2023-09-18 NOTE — PROGRESS NOTES
Pt presents for Glucagon GH stimulation test offering no compliants. Pt tolerated treatment and remained on bedrest for the duration of treatment. Blood drawn and sent to lab every 30minutes. AVS declined. Therapy plan complete.

## 2023-09-20 LAB
GH SERPL-MCNC: 0.2 NG/ML (ref 0–10)
GH SERPL-MCNC: 0.2 NG/ML (ref 0–10)
GH SERPL-MCNC: 0.5 NG/ML (ref 0–10)
GH SERPL-MCNC: 0.6 NG/ML (ref 0–10)

## 2023-09-21 LAB
GH SERPL-MCNC: 0.2 NG/ML (ref 0–10)
GH SERPL-MCNC: 0.6 NG/ML (ref 0–10)

## 2023-09-22 ENCOUNTER — DOCUMENTATION (OUTPATIENT)
Dept: ENDOCRINOLOGY | Facility: HOSPITAL | Age: 53
End: 2023-09-22

## 2023-09-28 ENCOUNTER — HOSPITAL ENCOUNTER (OUTPATIENT)
Dept: NON INVASIVE DIAGNOSTICS | Facility: CLINIC | Age: 53
Discharge: HOME | End: 2023-09-28
Payer: COMMERCIAL

## 2023-09-28 VITALS
WEIGHT: 239 LBS | BODY MASS INDEX: 31.68 KG/M2 | DIASTOLIC BLOOD PRESSURE: 88 MMHG | HEIGHT: 73 IN | SYSTOLIC BLOOD PRESSURE: 138 MMHG | OXYGEN SATURATION: 99 % | HEART RATE: 93 BPM

## 2023-09-28 DIAGNOSIS — I25.84 CORONARY ARTERY CALCIFICATION: ICD-10-CM

## 2023-09-28 DIAGNOSIS — R00.2 PALPITATIONS: ICD-10-CM

## 2023-09-28 DIAGNOSIS — I25.10 CORONARY ARTERY CALCIFICATION: ICD-10-CM

## 2023-09-28 LAB
CHEST PAIN STATEMENT: NORMAL
CHEST PAIN STATEMENT: NORMAL
MAX DIASTOLIC BP: 82 MMHG
MAX DIASTOLIC BP: 82 MMHG
MAX HEART RATE: 162 BPM
MAX HEART RATE: 162 BPM
MAX HR PERCENT: 95 %
MAX HR: 162 BPM
MAX PREDICTED HEART RATE: 169 BPM
MAX PREDICTED HEART RATE: 169 BPM
MAX. SYSTOLIC BP: 190 MMHG
MAX. SYSTOLIC BP: 190 MMHG
PROTOCOL NAME: NORMAL
PROTOCOL NAME: NORMAL
RATE PRESSURE PRODUCT: NORMAL
SL CV STRESS RECOVERY BP: NORMAL MMHG
SL CV STRESS RECOVERY HR: 106 BPM
SL CV STRESS RECOVERY O2 SAT: 98 %
SL CV STRESS STAGE REACHED: 4
STRESS ANGINA INDEX: 0
STRESS BASELINE BP: NORMAL MMHG
STRESS BASELINE HR: 93 BPM
STRESS O2 SAT REST: 99 %
STRESS PEAK HR: 162 BPM
STRESS POST ESTIMATED WORKLOAD: 13.4 METS
STRESS POST EXERCISE DUR MIN: 10 MIN
STRESS POST EXERCISE DUR SEC: 31 SEC
STRESS POST O2 SAT PEAK: 99 %
STRESS POST PEAK BP: 192 MMHG
TARGET HR FORMULA: NORMAL
TARGET HR FORMULA: NORMAL
TEST INDICATION: NORMAL
TEST INDICATION: NORMAL
TIME IN EXERCISE PHASE: NORMAL
TIME IN EXERCISE PHASE: NORMAL

## 2023-09-28 PROCEDURE — 93017 CV STRESS TEST TRACING ONLY: CPT

## 2023-09-28 PROCEDURE — 93016 CV STRESS TEST SUPVJ ONLY: CPT | Performed by: INTERNAL MEDICINE

## 2023-09-28 PROCEDURE — 93018 CV STRESS TEST I&R ONLY: CPT | Performed by: INTERNAL MEDICINE

## 2023-10-05 ENCOUNTER — OFFICE VISIT (OUTPATIENT)
Dept: FAMILY MEDICINE CLINIC | Facility: CLINIC | Age: 53
End: 2023-10-05
Payer: COMMERCIAL

## 2023-10-05 VITALS
WEIGHT: 242.13 LBS | OXYGEN SATURATION: 95 % | SYSTOLIC BLOOD PRESSURE: 130 MMHG | TEMPERATURE: 97.5 F | DIASTOLIC BLOOD PRESSURE: 84 MMHG | HEART RATE: 93 BPM | BODY MASS INDEX: 32.09 KG/M2 | HEIGHT: 73 IN

## 2023-10-05 DIAGNOSIS — D58.2 ELEVATED HEMOGLOBIN (HCC): ICD-10-CM

## 2023-10-05 DIAGNOSIS — E53.8 VITAMIN B12 DEFICIENCY: Primary | ICD-10-CM

## 2023-10-05 PROCEDURE — 99213 OFFICE O/P EST LOW 20 MIN: CPT | Performed by: STUDENT IN AN ORGANIZED HEALTH CARE EDUCATION/TRAINING PROGRAM

## 2023-10-05 NOTE — PROGRESS NOTES
Name: Manuel Forman      : 1970      MRN: [id number]  Encounter Provider: Alessia Mary DO  Encounter Date: 10/5/2023   Encounter department: [address]      1. Vitamin B12 deficiency  Assessment & Plan: With associated fatigue and low energy. Will check B12 level, cbc and iron panel. Orders:  -     CBC and differential; Future  -     Iron Panel (Includes Ferritin, Iron Sat%, Iron, and TIBC); Future  -     Vitamin B12; Future  -     CBC and differential  -     Vitamin B12    2. Elevated hemoglobin (HCC)  -     CBC and differential; Future  -     Iron Panel (Includes Ferritin, Iron Sat%, Iron, and TIBC); Future  -     CBC and differential         Subjective      Patient presents today for routine f/u  States that he has been feeling very low energy and fatigued. Review of Systems   Constitutional: Positive for fatigue. Negative for chills and fever. HENT: Negative for congestion and rhinorrhea. Respiratory: Negative for cough and shortness of breath. Cardiovascular: Negative for chest pain and palpitations. Neurological: Negative for dizziness and headaches.        Current Outpatient Medications on File Prior to Visit   Medication Sig   • Acidophilus Lactobacillus CAPS    • Ashwagandha 500 MG CAPS    • Blood Glucose Monitoring Suppl (OneTouch Verio) w/Device KIT Use in the morning   • Cholecalciferol (Vitamin D3) 125 MCG (5000 UT) CAPS    • glucose blood (OneTouch Verio) test strip Use as instructed- 2x daily   • Lancets (onetouch ultrasoft) lancets Use as instructed- 2x day   • levothyroxine 150 mcg tablet Take 1 tablet (150 mcg total) by mouth daily   • Magnesium 400 MG CAPS    • Milk Thistle 1000 MG CAPS Take 2,000 mg by mouth   • Multiple Vitamin (multivitamin) capsule Take 1 capsule by mouth daily   • Omega-3 Fatty Acids (fish oil) 1,000 mg Take 1,000 mg by mouth daily   • Potassium Citrate,Elemental K, 99 MG CAPS    • prednisoLONE acetate (PRED FORTE) 1 % ophthalmic suspension instill 1 drop into left eye four times a day   • RA Aspirin EC 81 MG EC tablet Take 1 tablet (81 mg total) by mouth daily   • SYRINGE-NEEDLE, DISP, 3 ML (BD Eclipse Syringe/Needle) 23G X 1-1/2" 3 ML MISC Use once a week   • Testosterone Cypionate 200 MG/ML SOLN Inject 50 mg as directed every 7 days   • Saw Palmetto, Serenoa repens, (Saw [place]) 540 MG CAPS    • SYRINGE-NEEDLE, DISP, 5 ML 22G X 1" 5 ML MISC Use every 14 (fourteen) days   • Syringe/Needle, Disp, (SYRINGE 3CC/25GX1") 25G X 1" 3 ML MISC Use every 14 (fourteen) days       Objective     /84 (BP Location: Right arm, Patient Position: Sitting, Cuff Size: Large)   Pulse 93   Temp 97.5 °F (36.4 °C) (Temporal)   Ht 6' 1" (1.854 m)   Wt 110 kg (242 lb 2 oz)   SpO2 95%   BMI 31.94 kg/m²     Physical Exam  Vitals reviewed. Constitutional:       Appearance: Normal appearance. HENT:      Head: Normocephalic and atraumatic. Mouth/Throat:      Mouth: Mucous membranes are moist.      Pharynx: No oropharyngeal exudate. Eyes:      Extraocular Movements: Extraocular movements intact. Cardiovascular:      Rate and Rhythm: Normal rate and regular rhythm. Heart sounds: Normal heart sounds. Pulmonary:      Effort: Pulmonary effort is normal.      Breath sounds: Normal breath sounds. Neurological:      General: No focal deficit present. Mental Status: He is alert and oriented to person, place, and time.    Psychiatric:         Mood and Affect: Mood normal.         Behavior: Behavior normal.          Malini , DO

## 2023-12-17 LAB
25(OH)D3+25(OH)D2 SERPL-MCNC: 78.8 NG/ML (ref 30–100)
ALBUMIN SERPL-MCNC: 4.9 G/DL (ref 3.8–4.9)
ALBUMIN/CREAT UR: 4 MG/G CREAT (ref 0–29)
ALBUMIN/GLOB SERPL: 2 {RATIO} (ref 1.2–2.2)
ALP SERPL-CCNC: 85 IU/L (ref 44–121)
ALT SERPL-CCNC: 23 IU/L (ref 0–44)
AST SERPL-CCNC: 23 IU/L (ref 0–40)
BASOPHILS # BLD AUTO: 0.1 X10E3/UL (ref 0–0.2)
BASOPHILS NFR BLD AUTO: 1 %
BILIRUB SERPL-MCNC: 0.5 MG/DL (ref 0–1.2)
BUN SERPL-MCNC: 16 MG/DL (ref 6–24)
BUN/CREAT SERPL: 20 (ref 9–20)
CALCIUM SERPL-MCNC: 10 MG/DL (ref 8.7–10.2)
CHLORIDE SERPL-SCNC: 101 MMOL/L (ref 96–106)
CHOLEST SERPL-MCNC: 311 MG/DL (ref 100–199)
CO2 SERPL-SCNC: 18 MMOL/L (ref 20–29)
CREAT SERPL-MCNC: 0.82 MG/DL (ref 0.76–1.27)
CREAT UR-MCNC: 155.8 MG/DL
CYTOLOGY CMNT CVX/VAG CYTO-IMP: ABNORMAL
EGFR: 106 ML/MIN/1.73
EOSINOPHIL # BLD AUTO: 0.3 X10E3/UL (ref 0–0.4)
EOSINOPHIL NFR BLD AUTO: 3 %
ERYTHROCYTE [DISTWIDTH] IN BLOOD BY AUTOMATED COUNT: 14.7 % (ref 11.6–15.4)
GH SERPL-MCNC: 0.1 NG/ML (ref 0–10)
GLOBULIN SER-MCNC: 2.4 G/DL (ref 1.5–4.5)
GLUCOSE SERPL-MCNC: 127 MG/DL (ref 70–99)
HBA1C MFR BLD: 5.8 % (ref 4.8–5.6)
HCT VFR BLD AUTO: 51.8 % (ref 37.5–51)
HDLC SERPL-MCNC: 43 MG/DL
HGB BLD-MCNC: 17.8 G/DL (ref 13–17.7)
IGF-I SERPL-MCNC: 59 NG/ML (ref 74–255)
IMM GRANULOCYTES # BLD: 0 X10E3/UL (ref 0–0.1)
IMM GRANULOCYTES NFR BLD: 0 %
LDLC SERPL CALC-MCNC: 238 MG/DL (ref 0–99)
LYMPHOCYTES # BLD AUTO: 2.1 X10E3/UL (ref 0.7–3.1)
LYMPHOCYTES NFR BLD AUTO: 22 %
MCH RBC QN AUTO: 33.1 PG (ref 26.6–33)
MCHC RBC AUTO-ENTMCNC: 34.4 G/DL (ref 31.5–35.7)
MCV RBC AUTO: 96 FL (ref 79–97)
MICROALBUMIN UR-MCNC: 6 UG/ML
MONOCYTES # BLD AUTO: 0.8 X10E3/UL (ref 0.1–0.9)
MONOCYTES NFR BLD AUTO: 8 %
NEUTROPHILS # BLD AUTO: 6.2 X10E3/UL (ref 1.4–7)
NEUTROPHILS NFR BLD AUTO: 66 %
PLATELET # BLD AUTO: 341 X10E3/UL (ref 150–450)
POTASSIUM SERPL-SCNC: 4.4 MMOL/L (ref 3.5–5.2)
PROT SERPL-MCNC: 7.3 G/DL (ref 6–8.5)
PTH-INTACT SERPL-MCNC: 12 PG/ML (ref 15–65)
RBC # BLD AUTO: 5.38 X10E6/UL (ref 4.14–5.8)
SL AMB VLDL CHOLESTEROL CALC: 30 MG/DL (ref 5–40)
SODIUM SERPL-SCNC: 140 MMOL/L (ref 134–144)
T4 FREE SERPL-MCNC: 1.3 NG/DL (ref 0.82–1.77)
TESTOST FREE SERPL-MCNC: 6.2 PG/ML (ref 7.2–24)
TESTOST SERPL-MCNC: 219 NG/DL (ref 264–916)
TRIGL SERPL-MCNC: 156 MG/DL (ref 0–149)
TSH SERPL DL<=0.005 MIU/L-ACNC: 3.08 UIU/ML (ref 0.45–4.5)
WBC # BLD AUTO: 9.5 X10E3/UL (ref 3.4–10.8)

## 2023-12-21 ENCOUNTER — OFFICE VISIT (OUTPATIENT)
Dept: ENDOCRINOLOGY | Facility: HOSPITAL | Age: 53
End: 2023-12-21
Payer: COMMERCIAL

## 2023-12-21 VITALS
WEIGHT: 226 LBS | OXYGEN SATURATION: 96 % | SYSTOLIC BLOOD PRESSURE: 110 MMHG | HEART RATE: 50 BPM | HEIGHT: 73 IN | DIASTOLIC BLOOD PRESSURE: 82 MMHG | BODY MASS INDEX: 29.95 KG/M2

## 2023-12-21 DIAGNOSIS — E78.2 MIXED HYPERLIPIDEMIA: ICD-10-CM

## 2023-12-21 DIAGNOSIS — E34.9 TESTOSTERONE DEFICIENCY: Primary | ICD-10-CM

## 2023-12-21 DIAGNOSIS — E11.65 TYPE 2 DIABETES MELLITUS WITH HYPERGLYCEMIA, WITHOUT LONG-TERM CURRENT USE OF INSULIN (HCC): ICD-10-CM

## 2023-12-21 DIAGNOSIS — E23.0 HYPOPITUITARISM (HCC): ICD-10-CM

## 2023-12-21 DIAGNOSIS — E23.0 GROWTH HORMONE DEFICIENCY (HCC): ICD-10-CM

## 2023-12-21 DIAGNOSIS — E03.9 HYPOTHYROIDISM, UNSPECIFIED TYPE: ICD-10-CM

## 2023-12-21 PROCEDURE — 99215 OFFICE O/P EST HI 40 MIN: CPT | Performed by: STUDENT IN AN ORGANIZED HEALTH CARE EDUCATION/TRAINING PROGRAM

## 2023-12-21 RX ORDER — TESTOSTERONE CYPIONATE 200 MG/ML
75 VIAL (ML) INTRAMUSCULAR
Qty: 10 ML | Refills: 1 | Status: SHIPPED | OUTPATIENT
Start: 2023-12-21

## 2023-12-21 RX ORDER — SALICYLIC ACID
POWDER (GRAM) MISCELLANEOUS
COMMUNITY

## 2023-12-21 RX ORDER — PARSLEY 450 MG
500 CAPSULE ORAL
Qty: 100 CAPSULE | Refills: 1 | Status: SHIPPED | OUTPATIENT
Start: 2023-12-21

## 2024-01-14 DIAGNOSIS — R79.89 ELEVATED FERRITIN LEVEL: ICD-10-CM

## 2024-01-14 DIAGNOSIS — E03.9 HYPOTHYROIDISM, UNSPECIFIED TYPE: ICD-10-CM

## 2024-01-14 RX ORDER — ASPIRIN 81 MG/1
81 TABLET, COATED ORAL DAILY
Qty: 90 TABLET | Refills: 1 | Status: SHIPPED | OUTPATIENT
Start: 2024-01-14

## 2024-01-14 RX ORDER — LEVOTHYROXINE SODIUM 0.15 MG/1
150 TABLET ORAL DAILY
Qty: 90 TABLET | Refills: 3 | Status: SHIPPED | OUTPATIENT
Start: 2024-01-14

## 2024-01-19 ENCOUNTER — TELEPHONE (OUTPATIENT)
Dept: FAMILY MEDICINE CLINIC | Facility: CLINIC | Age: 54
End: 2024-01-19

## 2024-02-15 ENCOUNTER — OFFICE VISIT (OUTPATIENT)
Dept: FAMILY MEDICINE CLINIC | Facility: CLINIC | Age: 54
End: 2024-02-15
Payer: COMMERCIAL

## 2024-02-15 VITALS
DIASTOLIC BLOOD PRESSURE: 76 MMHG | SYSTOLIC BLOOD PRESSURE: 98 MMHG | WEIGHT: 225.6 LBS | BODY MASS INDEX: 29.9 KG/M2 | OXYGEN SATURATION: 97 % | HEIGHT: 73 IN | RESPIRATION RATE: 14 BRPM | HEART RATE: 97 BPM

## 2024-02-15 DIAGNOSIS — Z00.00 ANNUAL PHYSICAL EXAM: Primary | ICD-10-CM

## 2024-02-15 PROCEDURE — 99396 PREV VISIT EST AGE 40-64: CPT | Performed by: STUDENT IN AN ORGANIZED HEALTH CARE EDUCATION/TRAINING PROGRAM

## 2024-02-15 NOTE — PROGRESS NOTES
ADULT ANNUAL PHYSICAL  [facility] PRIMARY CARE    NAME: Alfonso An  AGE: 53 y.o. SEX: male  : 1970     DATE: 2/15/2024     Assessment and Plan:     Problem List Items Addressed This Visit       Annual physical exam - Primary     Annual physical exam completed, patient due for colonoscopy in 2026.  No bloodwork indicated at this time as he recently had some completed in 2023 and has repeat labs already ordered for 2024.  Eye exam up-to-date, dental exam up-to-date.              Immunizations and preventive care screenings were discussed with patient today. Appropriate education was printed on patient's after visit summary.    Counseling:  Alcohol/drug use: discussed moderation in alcohol intake, the recommendations for healthy alcohol use, and avoidance of illicit drug use.  Dental Health: discussed importance of regular tooth brushing, flossing, and dental visits.  Exercise: the importance of regular exercise/physical activity was discussed. Recommend exercise 3-5 times per week for at least 30 minutes.          Return in about 3 months (around 5/15/2024) for DM f/u.     Chief Complaint:     Chief Complaint   Patient presents with    Annual Exam      History of Present Illness:     Adult Annual Physical   Patient here for a comprehensive physical exam. The patient reports no problems.    Diet and Physical Activity  Diet/Nutrition:  Keto/Carnivore regimen alternating. .   Exercise: no formal exercise.      Depression Screening  PHQ-2/9 Depression Screening    Little interest or pleasure in doing things: 0 - not at all  Feeling down, depressed, or hopeless: 0 - not at all  PHQ-2 Score: 0  PHQ-2 Interpretation: Negative depression screen       General Health  Sleep: sleeps well.   Hearing: normal - bilateral.  Vision:  Follows with Optho, will be having another visit on Thursday. .   Dental: regular dental visits.         Health  Symptoms include: none    Advanced Care Planning  Do you have an advanced directive? no  Do you have a durable medical power of ? no  ACP document given to patient? No    Colonoscopy due 11/2026  Blood work already ordered.      Review of Systems:     Review of Systems   Constitutional:  Negative for chills and fever.   HENT:  Negative for congestion and rhinorrhea.    Respiratory:  Negative for cough and shortness of breath.    Cardiovascular:  Negative for chest pain and palpitations.   Gastrointestinal:  Negative for abdominal pain, constipation and diarrhea.   Neurological:  Negative for dizziness and headaches.      Past Medical History:     Past Medical History:   Diagnosis Date    Allergic Seasonal    Cancer (HCC)     Diabetes mellitus (HCC)     Disease of thyroid gland       Past Surgical History:     Past Surgical History:   Procedure Laterality Date    CATARACT EXTRACTION, BILATERAL      EYE SURGERY  02/28/23 03/14/23    Cataract    HERNIA REPAIR      LIVER SURGERY      LYMPH NODE BIOPSY  1996    SPLENECTOMY, PARTIAL      TONSILLECTOMY        Family History:     Family History   Problem Relation Age of Onset    Diabetes Mother     Cancer Mother     Heart disease Mother     Coronary artery disease Mother     COPD Mother     Arthritis Mother     Diabetes type II Mother     Heart disease Father     Diabetes Father     Cancer Father     Coronary artery disease Father     COPD Father     Arthritis Father     Diabetes type II Father     Hypertension Father     Hyperlipidemia Father     Lupus Sister     Autoimmune disease Sister         Lupus    Cancer Sister         Melanoma    Vision loss Maternal Grandfather     Prostate cancer Paternal Grandfather     Hearing loss Sister     Diabetes type II Sister     Bipolar disorder Brother     Diabetes type II Brother     Hypertension Brother       Social History:     Social History     Socioeconomic History    Marital status: /Civil Union      Spouse name: None    Number of children: None    Years of education: None    Highest education level: None   Occupational History    None   Tobacco Use    Smoking status: Former     Current packs/day: 0.00     Average packs/day: 2.0 packs/day for 26.5 years (53.1 ttl pk-yrs)     Types: Cigarettes     Start date: 1984     Quit date: 7/15/2010     Years since quittin.5     Passive exposure: Past    Smokeless tobacco: Never   Vaping Use    Vaping status: Never Used   Substance and Sexual Activity    Alcohol use: Not Currently    Drug use: Not Currently    Sexual activity: Not Currently     Partners: Male     Birth control/protection: None   Other Topics Concern    None   Social History Narrative    None     Social Determinants of Health     Financial Resource Strain: Low Risk  (2022)    Received from [employer]    Financial Resource Strain     In the last 12 months, did you worry that your food could run out before you got money to buy more? : No   Food Insecurity: Low Risk  (2022)    Received from [employer]    Food  Insecurity     In the last 12 months, did you worry that your food could run out before you got money to buy more? : No   Transportation Needs: Low Risk  (2022)    Received from [employer]    Transportation     In the last 3 months, has lack of transportation kept you from medical appointments or getting your medications?: No   Physical Activity: Not on file   Stress: Not on file   Social Connections: Not on file   Intimate Partner Violence: Not on file   Housing Stability: Low Risk  (2022)    Received from [employer]    Housing Stability     In the last 12 months, did you worry that your food could run out before you got money to buy more? : No      Current Medications:     Current Outpatient Medications   Medication Sig Dispense Refill    Acidophilus Lactobacillus CAPS       Ashwagandha 500 MG CAPS  "Take 1 capsule (500 mg total) by mouth Daily at 2am 100 capsule 1    Aspirin Low Dose 81 MG EC tablet take 1 tablet by mouth once daily 90 tablet 1    Blood Glucose Monitoring Suppl (OneTouch Verio) w/Device KIT Use in the morning 1 kit 0    castor oil Using topically      Cholecalciferol (Vitamin D3) 125 MCG (5000 UT) CAPS       glucose blood (OneTouch Verio) test strip Use as instructed- 2x daily 200 each 1    Lancets (onetouch ultrasoft) lancets Use as instructed- 2x day 200 each 1    levothyroxine 150 mcg tablet take 1 tablet by mouth once daily 90 tablet 3    Magnesium 400 MG CAPS       Milk Thistle 1000 MG CAPS Take 2,000 mg by mouth      Multiple Vitamin (multivitamin) capsule Take 1 capsule by mouth daily      Omega-3 Fatty Acids (fish oil) 1,000 mg Take 1,000 mg by mouth daily      Potassium Citrate,Elemental K, 99 MG CAPS       SYRINGE-NEEDLE, DISP, 3 ML (BD Eclipse Syringe/Needle) 23G X 1-1/2\" 3 ML MISC Use once a week 100 each 1    Testosterone Cypionate 200 MG/ML SOLN Inject 75 mg as directed every 7 days 10 mL 1     No current facility-administered medications for this visit.      Allergies:     Allergies   Allergen Reactions    Allyl Isothiocyanate Other (See Comments)    Other Nasal Congestion     Mustard    Keflex [Cephalexin] Rash      Physical Exam:     BP 98/76 (BP Location: Right arm, Patient Position: Sitting)   Pulse 97   Resp 14   Ht 6' 1\" (1.854 m)   Wt 102 kg (225 lb 9.6 oz)   SpO2 97%   BMI 29.76 kg/m²     Physical Exam  Vitals reviewed.   Constitutional:       Appearance: He is obese.   HENT:      Head: Normocephalic and atraumatic.      Mouth/Throat:      Mouth: Mucous membranes are moist.      Pharynx: No oropharyngeal exudate or posterior oropharyngeal erythema.   Eyes:      Extraocular Movements: Extraocular movements intact.   Cardiovascular:      Rate and Rhythm: Normal rate and regular rhythm.      Heart sounds: Normal heart sounds.   Pulmonary:      Effort: Pulmonary effort " is normal.      Breath sounds: Normal breath sounds.   Musculoskeletal:      Cervical back: Neck supple. No tenderness.   Lymphadenopathy:      Cervical: No cervical adenopathy.   Neurological:      General: No focal deficit present.      Mental Status: He is alert and oriented to person, place, and time.   Psychiatric:         Mood and Affect: Mood normal.         Behavior: Behavior normal.          Kanchan Mendoza,   [facility] PRIMARY [facility]

## 2024-02-15 NOTE — ASSESSMENT & PLAN NOTE
Annual physical exam completed, patient due for colonoscopy in November 2026.  No bloodwork indicated at this time as he recently had some completed in December 2023 and has repeat labs already ordered for March 2024.  Eye exam up-to-date, dental exam up-to-date.

## 2024-02-27 DIAGNOSIS — E34.9 TESTOSTERONE DEFICIENCY: Primary | ICD-10-CM

## 2024-02-28 RX ORDER — TESTOSTERONE CYPIONATE 200 MG/ML
INJECTION, SOLUTION INTRAMUSCULAR
Qty: 10 ML | Refills: 1 | Status: SHIPPED | OUTPATIENT
Start: 2024-02-28

## 2024-03-07 ENCOUNTER — OFFICE VISIT (OUTPATIENT)
Dept: FAMILY MEDICINE CLINIC | Facility: CLINIC | Age: 54
End: 2024-03-07
Payer: COMMERCIAL

## 2024-03-07 VITALS
TEMPERATURE: 98.1 F | SYSTOLIC BLOOD PRESSURE: 106 MMHG | DIASTOLIC BLOOD PRESSURE: 70 MMHG | WEIGHT: 225 LBS | BODY MASS INDEX: 29.82 KG/M2 | HEIGHT: 73 IN | HEART RATE: 93 BPM | OXYGEN SATURATION: 99 %

## 2024-03-07 DIAGNOSIS — H35.372 MACULAR PUCKERING, LEFT EYE: ICD-10-CM

## 2024-03-07 DIAGNOSIS — Z01.818 PREOPERATIVE CLEARANCE: ICD-10-CM

## 2024-03-07 DIAGNOSIS — Z01.818 PREOP TESTING: Primary | ICD-10-CM

## 2024-03-07 PROCEDURE — 99213 OFFICE O/P EST LOW 20 MIN: CPT | Performed by: STUDENT IN AN ORGANIZED HEALTH CARE EDUCATION/TRAINING PROGRAM

## 2024-03-07 PROCEDURE — 93000 ELECTROCARDIOGRAM COMPLETE: CPT | Performed by: STUDENT IN AN ORGANIZED HEALTH CARE EDUCATION/TRAINING PROGRAM

## 2024-03-07 RX ORDER — POLYMYXIN B SULFATE AND TRIMETHOPRIM 1; 10000 MG/ML; [USP'U]/ML
1 SOLUTION OPHTHALMIC 4 TIMES DAILY
COMMUNITY
Start: 2024-02-22

## 2024-03-07 RX ORDER — PREDNISOLONE ACETATE 10 MG/ML
1 SUSPENSION/ DROPS OPHTHALMIC 4 TIMES DAILY
COMMUNITY
Start: 2024-02-22

## 2024-03-07 NOTE — ASSESSMENT & PLAN NOTE
Left Vitrectomy with membrane peel scheduled for 3/13/2024, patient medically optimized for procedure, should discontinue ASA 5 days prior to procedure.

## 2024-03-07 NOTE — PROGRESS NOTES
"Subjective:     Alfonso An is a 53 y.o. male who presents to the office today for a preoperative consultation at the request of surgeon Dr. Ariel Winter who plans on performing Vitrectomy with membrane peel of left eye on March 13 2024. This consultation is requested for the specific conditions prompting preoperative evaluation (i.e. because of potential affect on operative risk).  Planned anesthesia:  Monitored Anesthesia . The patient has the following known anesthesia issues:  None . Patients bleeding risk: no recent abnormal bleeding.     The following portions of the patient's history were reviewed and updated as appropriate: allergies, current medications, past family history, past medical history, past social history, past surgical history, and problem list.    Review of Systems  Pertinent items are noted in HPI.     Objective:     /70 (BP Location: Left arm, Patient Position: Sitting, Cuff Size: Adult)   Pulse 93   Temp 98.1 °F (36.7 °C) (Temporal)   Ht 6' 1\" (1.854 m)   Wt 102 kg (225 lb)   SpO2 99%   BMI 29.69 kg/m²       General appearance: alert and oriented, in no acute distress  Head: Normocephalic, without obvious abnormality, atraumatic  Throat: lips, mucosa, and tongue normal; teeth and gums normal  Lungs: clear to auscultation bilaterally  Heart: regular rate and rhythm, S1, S2 normal, and systolic murmur: systolic ejection 2/6, crescendo at 2nd left intercostal space  Abdomen: soft, non-tender; bowel sounds normal; no masses,  no organomegaly  Extremities: extremities normal, warm and well-perfused; no cyanosis, clubbing, or edema  Neurologic: Grossly normal    Predictors of intubation difficulty:   Morbid obesity? no   Anatomically abnormal facies? no   Prominent incisors? no   Receding mandible? no   Short, thick neck? no   Neck range of motion: normal      Cardiographics  ECG:  NSR, HR 82 bpm, LAD.  Echocardiogram: not done    Assessment:     53 y.o. male with planned surgery " as above.    Known risk factors for perioperative complications: None        Cardiac Risk Estimation: 1    Plan:    Macular puckering, left eye  Left Vitrectomy with membrane peel scheduled for 3/13/2024, patient medically optimized for procedure, should discontinue ASA 5 days prior to procedure.       Kanchan Mendoza DO

## 2024-04-01 LAB
ALBUMIN SERPL-MCNC: 4.9 G/DL (ref 3.8–4.9)
ALBUMIN/CREAT UR: 5 MG/G CREAT (ref 0–29)
ALBUMIN/GLOB SERPL: 1.8 {RATIO} (ref 1.2–2.2)
ALP SERPL-CCNC: 75 IU/L (ref 44–121)
ALT SERPL-CCNC: 23 IU/L (ref 0–44)
AST SERPL-CCNC: 25 IU/L (ref 0–40)
BASOPHILS # BLD AUTO: 0.1 X10E3/UL (ref 0–0.2)
BASOPHILS NFR BLD AUTO: 1 %
BILIRUB SERPL-MCNC: 0.3 MG/DL (ref 0–1.2)
BUN SERPL-MCNC: 19 MG/DL (ref 6–24)
BUN/CREAT SERPL: 18 (ref 9–20)
CALCIUM SERPL-MCNC: 10.3 MG/DL (ref 8.7–10.2)
CHLORIDE SERPL-SCNC: 99 MMOL/L (ref 96–106)
CHOLEST SERPL-MCNC: 330 MG/DL (ref 100–199)
CO2 SERPL-SCNC: 21 MMOL/L (ref 20–29)
CREAT SERPL-MCNC: 1.04 MG/DL (ref 0.76–1.27)
CREAT UR-MCNC: 227.6 MG/DL
CYTOLOGY CMNT CVX/VAG CYTO-IMP: ABNORMAL
EGFR: 86 ML/MIN/1.73
EOSINOPHIL # BLD AUTO: 0.2 X10E3/UL (ref 0–0.4)
EOSINOPHIL NFR BLD AUTO: 2 %
ERYTHROCYTE [DISTWIDTH] IN BLOOD BY AUTOMATED COUNT: 14.3 % (ref 11.6–15.4)
GLOBULIN SER-MCNC: 2.8 G/DL (ref 1.5–4.5)
GLUCOSE SERPL-MCNC: 93 MG/DL (ref 70–99)
HBA1C MFR BLD: 5.5 % (ref 4.8–5.6)
HCT VFR BLD AUTO: 59.2 % (ref 37.5–51)
HDLC SERPL-MCNC: 39 MG/DL
HGB BLD-MCNC: 19.8 G/DL (ref 13–17.7)
IMM GRANULOCYTES # BLD: 0 X10E3/UL (ref 0–0.1)
IMM GRANULOCYTES NFR BLD: 0 %
LDLC SERPL CALC-MCNC: 250 MG/DL (ref 0–99)
LYMPHOCYTES # BLD AUTO: 2.8 X10E3/UL (ref 0.7–3.1)
LYMPHOCYTES NFR BLD AUTO: 29 %
MCH RBC QN AUTO: 33.1 PG (ref 26.6–33)
MCHC RBC AUTO-ENTMCNC: 33.4 G/DL (ref 31.5–35.7)
MCV RBC AUTO: 99 FL (ref 79–97)
MICROALBUMIN UR-MCNC: 10.8 UG/ML
MONOCYTES # BLD AUTO: 0.8 X10E3/UL (ref 0.1–0.9)
MONOCYTES NFR BLD AUTO: 8 %
NEUTROPHILS # BLD AUTO: 5.7 X10E3/UL (ref 1.4–7)
NEUTROPHILS NFR BLD AUTO: 60 %
PLATELET # BLD AUTO: 359 X10E3/UL (ref 150–450)
POTASSIUM SERPL-SCNC: 4.4 MMOL/L (ref 3.5–5.2)
PROT SERPL-MCNC: 7.7 G/DL (ref 6–8.5)
RBC # BLD AUTO: 5.99 X10E6/UL (ref 4.14–5.8)
SL AMB VLDL CHOLESTEROL CALC: 41 MG/DL (ref 5–40)
SODIUM SERPL-SCNC: 142 MMOL/L (ref 134–144)
T4 FREE SERPL-MCNC: 1.61 NG/DL (ref 0.82–1.77)
TESTOST FREE SERPL-MCNC: 15.5 PG/ML (ref 7.2–24)
TESTOST SERPL-MCNC: 793 NG/DL (ref 264–916)
TRIGL SERPL-MCNC: 201 MG/DL (ref 0–149)
TSH SERPL DL<=0.005 MIU/L-ACNC: 1.8 UIU/ML (ref 0.45–4.5)
WBC # BLD AUTO: 9.6 X10E3/UL (ref 3.4–10.8)

## 2024-04-12 ENCOUNTER — OFFICE VISIT (OUTPATIENT)
Dept: ENDOCRINOLOGY | Facility: HOSPITAL | Age: 54
End: 2024-04-12
Payer: COMMERCIAL

## 2024-04-12 VITALS
DIASTOLIC BLOOD PRESSURE: 70 MMHG | BODY MASS INDEX: 29.55 KG/M2 | WEIGHT: 223 LBS | HEART RATE: 88 BPM | HEIGHT: 73 IN | SYSTOLIC BLOOD PRESSURE: 106 MMHG | OXYGEN SATURATION: 96 %

## 2024-04-12 DIAGNOSIS — E03.9 HYPOTHYROIDISM, UNSPECIFIED TYPE: ICD-10-CM

## 2024-04-12 DIAGNOSIS — E11.65 TYPE 2 DIABETES MELLITUS WITH HYPERGLYCEMIA, WITHOUT LONG-TERM CURRENT USE OF INSULIN (HCC): ICD-10-CM

## 2024-04-12 DIAGNOSIS — E23.0 HYPOPITUITARISM (HCC): ICD-10-CM

## 2024-04-12 DIAGNOSIS — E34.9 TESTOSTERONE DEFICIENCY: Primary | ICD-10-CM

## 2024-04-12 PROCEDURE — 99215 OFFICE O/P EST HI 40 MIN: CPT | Performed by: PHYSICIAN ASSISTANT

## 2024-04-12 NOTE — PROGRESS NOTES
Alfonso An 53 y.o. male MRN: [id number]    Encounter: [id number]      Assessment/Plan     Assessment:  This is a 53 y.o.-year-old male with hypothyroidism, hypogonadism, type 2 diabetes, growth hormone deficiency.    Plan:  1.  Hypothyroidism: Recent thyroid lab work came back with a normal TSH and free T4.  At this time is clinically and biochemically euthyroid.  He will continue with levothyroxine 150 mcg daily.  Contact the office if there is any change in symptoms.  Follow-up in 3 months with lab work completed prior to office visit.    2.  Hypogonadism: Recent testosterone levels were in normal range, however hemoglobin/hematocrit plus cholesterol is elevated.  He has already decreased the amount of testosterone he is taking to 75 mg weekly.  As he is having problems with his vision, and possibly filling his syringe to the appropriate level, and has also failed topical testosterone, I do think it be beneficial for him to switch to Xyosted 75 mg weekly as he does not have to worry about drawing up the testosterone.  Also the testosterone enanthate may keep things a little bit more stable.  Repeat testosterone and CBC prior to next office visit.    3.  Type 2 diabetes: Most recent hemoglobin A1c is 5.5.  Doing well with diet control.  Continue with lifestyle modifications to help improve glucose levels.  Continue checking fasting blood sugar daily.  Contact the office if there is any concerns with glucose levels.    4.  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  Treatment options have been discussed in the past and currently have been deferred.  Will bring up with Dr. Padilla at next office visit.    5.  Hyperlipidemia: Cholesterol levels continue to increase, this could be due to testosterone therapy.  He will continue with milk thistle and fish oil.  Once again did discuss possibly utilizing red rice yeast.  Does not want to do any prescription medications at  this time.    I have spent a total time of 50 minutes on 04/12/24 in caring for this patient including Diagnostic results, Prognosis, Risks and benefits of tx options, Instructions for management, Importance of tx compliance, Risk factor reductions, Impressions, Documenting in the medical record, and Reviewing / ordering tests, medicine, procedures  .      CC: Panhypopituitarism and diabetes follow-up    History of Present Illness     HPI:  Alfonso An is a 53 y.o. year old male with history of lymphoma in 1996 tx with radiation and chemotherapy- neck to pelvis who was then diagnosed with hypothyroidism in 2009 on levothyroxine, hypogonadism on testosterone replacement, growth hormone deficiency, and type 2 diabetes diet controlled.  Other PMHx of ?hemachromatosis, NAFLD.  He presents today for routine follow-up.    Hypogonadism:-Previously on AndroGel, but was consistently having low testosterone on highest dose.  Was switched to testosterone cypionate which has significantly improved his testosterone, however this has been causing issues with hemoglobin/hematocrit and lipid levels.  He was previously taking testosterone 100 mg weekly, but decreased to 75 mg weekly after noticing hemoglobin and hematocrit were elevated.  Does have some concerns that he may have been taking too much testosterone as his vision is poor recently and has a hard time with the syringes.  Continues with feeling tired and low energy, difficulty losing weight, and unable to recover from workouts.  Also has a low libido.  Denies any headaches, chest pain, pain or swelling in lower extremities.     Hypothyroidism:-Is currently on levothyroxine 150 mcg daily.  Taking medication appropriately.  PSH and free T4 completed March 29, 2024 was in normal range. Feels tired and no energy as noted above.  Denies any heat or cold intolerance, palpitations, abdominal pain, diarrhea or constipation, tremors.     Diabetes:- patient reports previously  diabetes was under control. Previously on metformin, reports was on keto diet and lost weight and diabetes was under control.  Continues to lose weight, but has a hard time keeping the weight off.  Pancreatic cancer, FELIX, Cushing's has been ruled out in the past.  Currently doing well with diet control.  Had side effects with metformin in the past.  Has had retinal detachment and surgery completed June 2023.  Is following up with ophthalmology at [facility].  Last diabetic foot exam was completed June 2023.  Currently checking glucose levels on a daily basis.  Average blood sugar over the last 90 days was 114.  Typically ranging between 80 and 120.     Hyperlipidemia:- elevated level in past and wanted to work on lifestyle changes. Patient taking milk thistle. Does not want any other medications given hx of myalgia on statin/zetia.  Continues to try to make lifestyle modifications to help improve lipid levels.  Does believe part of the issue may be due to his testosterone treatment.     Low GH:- Patient previously found to have low IGF-1 level 55 03/23 repeat, IGF 34 with GH 0.1. Glucagon stim testing done which was positive for GH def. Does report tiredness and weak muscles. But also reports history of increase scar tissue formation     Family hx and social hx as below     Review of Systems   Constitutional:  Positive for fatigue.        Difficulty with weight loss.   Eyes:  Positive for visual disturbance (Retinal detachment).   Genitourinary:         Decreased libido   Musculoskeletal:  Positive for myalgias.       Historical Information   Past Medical History:   Diagnosis Date   • Allergic Seasonal   • Cancer (HCC)    • Diabetes mellitus (HCC)    • Disease of thyroid gland      Past Surgical History:   Procedure Laterality Date   • CATARACT EXTRACTION, BILATERAL     • EYE SURGERY  02/28/23 03/14/23    Cataract   • HERNIA REPAIR     • LIVER SURGERY     • LYMPH NODE BIOPSY  1996   • SPLENECTOMY, PARTIAL     •  TONSILLECTOMY       Social History   Social History     Substance and Sexual Activity   Alcohol Use Not Currently     Social History     Substance and Sexual Activity   Drug Use Not Currently     Social History     Tobacco Use   Smoking Status Former   • Current packs/day: 0.00   • Average packs/day: 2.0 packs/day for 26.5 years (53.1 ttl pk-yrs)   • Types: Cigarettes   • Start date: 1984   • Quit date: 7/15/2010   • Years since quittin.7   • Passive exposure: Past   Smokeless Tobacco Never     Family History:   Family History   Problem Relation Age of Onset   • Diabetes Mother    • Cancer Mother    • Heart disease Mother    • Coronary artery disease Mother    • COPD Mother    • Arthritis Mother    • Diabetes type II Mother    • Heart disease Father    • Diabetes Father    • Cancer Father    • Coronary artery disease Father    • COPD Father    • Arthritis Father    • Diabetes type II Father    • Hypertension Father    • Hyperlipidemia Father    • Lupus Sister    • Autoimmune disease Sister         Lupus   • Cancer Sister         Melanoma   • Vision loss Maternal Grandfather    • Prostate cancer Paternal Grandfather    • Hearing loss Sister    • Diabetes type II Sister    • Bipolar disorder Brother    • Diabetes type II Brother    • Hypertension Brother        Meds/Allergies   Current Outpatient Medications   Medication Sig Dispense Refill   • Acidophilus Lactobacillus CAPS      • Ashwagandha 500 MG CAPS Take 1 capsule (500 mg total) by mouth Daily at 2am 100 capsule 1   • Aspirin Low Dose 81 MG EC tablet take 1 tablet by mouth once daily 90 tablet 1   • Blood Glucose Monitoring Suppl (OneTouch Verio) w/Device KIT Use in the morning 1 kit 0   • castor oil Using topically     • Cholecalciferol (Vitamin D3) 125 MCG (5000 UT) CAPS      • glucose blood (OneTouch Verio) test strip Use as instructed- 2x daily 200 each 1   • Lancets (onetouch ultrasoft) lancets Use as instructed- 2x day 200 each 1   • levothyroxine  "150 mcg tablet take 1 tablet by mouth once daily 90 tablet 3   • Magnesium 400 MG CAPS      • Milk Thistle 1000 MG CAPS Take 2,000 mg by mouth     • Multiple Vitamin (multivitamin) capsule Take 1 capsule by mouth daily     • Omega-3 Fatty Acids (fish oil) 1,000 mg Take 1,000 mg by mouth daily     • polymyxin b-trimethoprim (POLYTRIM) ophthalmic solution Administer 1 drop into the left eye 4 (four) times a day     • Potassium Citrate,Elemental K, 99 MG CAPS      • SYRINGE-NEEDLE, DISP, 3 ML (BD Eclipse Syringe/Needle) 23G X 1-1/2\" 3 ML MISC Use once a week 100 each 1   • testosterone cypionate (DEPO-TESTOSTERONE) 200 mg/mL SOLN inject 100 milligrams as directed every 7 days 10 mL 1   • Testosterone Cypionate 200 MG/ML SOLN Inject 75 mg as directed every 7 days 10 mL 1   • Testosterone Enanthate 75 MG/0.5ML SOAJ Inject 75 mg under the skin once a week 2 mL 5   • prednisoLONE acetate (PRED FORTE) 1 % ophthalmic suspension Administer 1 drop into the left eye 4 (four) times a day (Patient not taking: Reported on 4/12/2024)       No current facility-administered medications for this visit.     Allergies   Allergen Reactions   • Allyl Isothiocyanate Other (See Comments)   • Other Nasal Congestion     Mustard   • Keflex [Cephalexin] Rash       Objective   Vitals: Blood pressure 106/70, pulse 88, height 6' 1\" (1.854 m), weight 101 kg (223 lb), SpO2 96%.    Physical Exam  Vitals and nursing note reviewed.   Constitutional:       General: He is not in acute distress.     Appearance: Normal appearance. He is not diaphoretic.   HENT:      Head: Normocephalic and atraumatic.   Eyes:      General: No scleral icterus.     Extraocular Movements: Extraocular movements intact.      Conjunctiva/sclera: Conjunctivae normal.   Cardiovascular:      Rate and Rhythm: Normal rate and regular rhythm.      Heart sounds: No murmur heard.  Pulmonary:      Effort: Pulmonary effort is normal. No respiratory distress.      Breath sounds: Normal " breath sounds. No wheezing.   Musculoskeletal:      Cervical back: Normal range of motion.      Right lower leg: No edema.      Left lower leg: No edema.   Lymphadenopathy:      Cervical: No cervical adenopathy.   Skin:     General: Skin is warm and dry.   Neurological:      Mental Status: He is alert and oriented to person, place, and time. Mental status is at baseline.      Sensory: No sensory deficit.      Gait: Gait normal.   Psychiatric:         Mood and Affect: Mood normal.         Behavior: Behavior normal.         Thought Content: Thought content normal.         The history was obtained from the review of the chart, patient.    Lab Results:   Lab Results   Component Value Date/Time    Potassium 4.4 03/29/2024 08:04 AM    Potassium 4.4 12/15/2023 08:57 AM    Potassium 4.7 09/08/2023 08:26 AM    Chloride 99 03/29/2024 08:04 AM    Chloride 101 12/15/2023 08:57 AM    Chloride 100 09/08/2023 08:26 AM    CO2 21 03/29/2024 08:04 AM    CO2 18 (L) 12/15/2023 08:57 AM    CO2 22 09/08/2023 08:26 AM    BUN 19 03/29/2024 08:04 AM    BUN 16 12/15/2023 08:57 AM    BUN 13 09/08/2023 08:26 AM    Creatinine 1.04 03/29/2024 08:04 AM    Creatinine 0.82 12/15/2023 08:57 AM    Creatinine 0.91 09/08/2023 08:26 AM    Glucose, Fasting 105 (H) 09/18/2023 12:26 PM    Glucose, Fasting 119 (H) 09/18/2023 11:56 AM    Glucose, Fasting 130 (H) 09/18/2023 11:26 AM    eGFR 86 03/29/2024 08:04 AM    eGFR 106 12/15/2023 08:57 AM    eGFR 102 09/08/2023 08:26 AM    Free t4 1.61 03/29/2024 08:04 AM    Free t4 1.30 12/15/2023 08:57 AM    Free t4 1.40 06/20/2023 08:33 AM    Cortisol AM 12.5 06/29/2023 08:57 AM    Testosterone, Free 15.5 03/29/2024 08:04 AM    Testosterone, Free 6.2 (L) 12/15/2023 08:57 AM    Testosterone, Free 13.4 09/08/2023 08:26 AM             Imaging Studies:  Results for orders placed during the hospital encounter of 05/07/23    MRI brain pituitary wo and w contrast    Impression  Normal MRI of the brain.    Dedicated imaging  "of the pituitary gland appears unremarkable.    Workstation performed: [id number]    ?     I have personally reviewed pertinent reports.      Portions of the record may have been created with voice recognition software. Occasional wrong word or \"sound a like\" substitutions may have occurred due to the inherent limitations of voice recognition software. Read the chart carefully and recognize, using context, where substitutions have occurred.    "

## 2024-05-15 ENCOUNTER — OFFICE VISIT (OUTPATIENT)
Dept: FAMILY MEDICINE CLINIC | Facility: CLINIC | Age: 54
End: 2024-05-15
Payer: COMMERCIAL

## 2024-05-15 VITALS
OXYGEN SATURATION: 97 % | DIASTOLIC BLOOD PRESSURE: 72 MMHG | SYSTOLIC BLOOD PRESSURE: 106 MMHG | HEART RATE: 85 BPM | RESPIRATION RATE: 16 BRPM | BODY MASS INDEX: 28.44 KG/M2 | HEIGHT: 73 IN | WEIGHT: 214.6 LBS | TEMPERATURE: 97.3 F

## 2024-05-15 DIAGNOSIS — Z12.5 PROSTATE CANCER SCREENING: ICD-10-CM

## 2024-05-15 DIAGNOSIS — F17.211 NICOTINE DEPENDENCE, CIGARETTES, IN REMISSION: ICD-10-CM

## 2024-05-15 DIAGNOSIS — E11.65 TYPE 2 DIABETES MELLITUS WITH HYPERGLYCEMIA, WITHOUT LONG-TERM CURRENT USE OF INSULIN (HCC): Primary | ICD-10-CM

## 2024-05-15 PROCEDURE — 99213 OFFICE O/P EST LOW 20 MIN: CPT | Performed by: STUDENT IN AN ORGANIZED HEALTH CARE EDUCATION/TRAINING PROGRAM

## 2024-05-15 RX ORDER — AMOXICILLIN 500 MG/1
CAPSULE ORAL
COMMUNITY
Start: 2024-05-08

## 2024-05-15 NOTE — ASSESSMENT & PLAN NOTE
Diabetes stable with lifestyle modifications  Diabetic Foot Exam Completed today   Lab Results   Component Value Date    HGBA1C 5.5 03/29/2024

## 2024-05-15 NOTE — PROGRESS NOTES
Ambulatory Visit  Name: Alfonso An      : 1970      MRN: [id number]  Encounter Provider: Kanchan Mendoza DO  Encounter Date: 5/15/2024   Encounter department: [facility]    Assessment & Plan   1. Type 2 diabetes mellitus with hyperglycemia, without long-term current use of insulin (HCC)  Assessment & Plan:  Diabetes stable with lifestyle modifications  Diabetic Foot Exam Completed today   Lab Results   Component Value Date    HGBA1C 5.5 2024     2. Nicotine dependence, cigarettes, in remission  3. Prostate cancer screening  -     PSA, Total Screen; Future       History of Present Illness       Diabetes  He presents for his follow-up diabetic visit. He has type 2 diabetes mellitus. His disease course has been stable. Pertinent negatives for hypoglycemia include no dizziness or headaches. Pertinent negatives for diabetes include no chest pain.       Review of Systems   Constitutional:  Negative for chills and fever.   HENT:  Negative for congestion and rhinorrhea.    Respiratory:  Negative for cough, shortness of breath and wheezing.    Cardiovascular:  Negative for chest pain and palpitations.   Gastrointestinal:  Negative for abdominal pain.   Neurological:  Negative for dizziness and headaches.       Medical History Reviewed by provider this encounter:  Tobacco  Allergies  Meds  Problems  Med Hx  Surg Hx  Fam Hx       Current Outpatient Medications on File Prior to Visit   Medication Sig Dispense Refill   • Acidophilus Lactobacillus CAPS      • amoxicillin (AMOXIL) 500 mg capsule take 4 capsules by mouth 1 hour prior to appointment     • Ashwagandha 500 MG CAPS Take 1 capsule (500 mg total) by mouth Daily at 2am 100 capsule 1   • Blood Glucose Monitoring Suppl (OneTouch Verio) w/Device KIT Use in the morning 1 kit 0   • castor oil Using topically     • Cholecalciferol (Vitamin D3) 125 MCG (5000 UT) CAPS      • glucose blood (OneTouch Verio) test strip Use  "as instructed- 2x daily 200 each 1   • Lancets (onetouch ultrasoft) lancets Use as instructed- 2x day 200 each 1   • levothyroxine 150 mcg tablet take 1 tablet by mouth once daily 90 tablet 3   • Magnesium 400 MG CAPS      • Milk Thistle 1000 MG CAPS Take 2,000 mg by mouth     • Multiple Vitamin (multivitamin) capsule Take 1 capsule by mouth daily     • Omega-3 Fatty Acids (fish oil) 1,000 mg Take 1,000 mg by mouth daily     • Potassium Citrate,Elemental K, 99 MG CAPS      • SYRINGE-NEEDLE, DISP, 3 ML (BD Eclipse Syringe/Needle) 23G X 1-1/2\" 3 ML MISC Use once a week 100 each 1   • Testosterone Cypionate 200 MG/ML SOLN Inject 75 mg as directed every 7 days 10 mL 1   • Testosterone Enanthate 75 MG/0.5ML SOAJ Inject 75 mg under the skin once a week (Patient not taking: Reported on 5/15/2024) 2 mL 5   • [DISCONTINUED] Aspirin Low Dose 81 MG EC tablet take 1 tablet by mouth once daily 90 tablet 1   • [DISCONTINUED] polymyxin b-trimethoprim (POLYTRIM) ophthalmic solution Administer 1 drop into the left eye 4 (four) times a day     • [DISCONTINUED] prednisoLONE acetate (PRED FORTE) 1 % ophthalmic suspension Administer 1 drop into the left eye 4 (four) times a day (Patient not taking: Reported on 4/12/2024)     • [DISCONTINUED] testosterone cypionate (DEPO-TESTOSTERONE) 200 mg/mL SOLN inject 100 milligrams as directed every 7 days 10 mL 1     No current facility-administered medications on file prior to visit.        Objective     /72 (BP Location: Right arm, Patient Position: Sitting)   Pulse 85   Temp (!) 97.3 °F (36.3 °C) (Temporal)   Resp 16   Ht 6' 1\" (1.854 m)   Wt 97.3 kg (214 lb 9.6 oz)   SpO2 97%   BMI 28.31 kg/m²     Physical Exam  Vitals reviewed.   Constitutional:       Appearance: Normal appearance.   HENT:      Head: Normocephalic and atraumatic.      Mouth/Throat:      Mouth: Mucous membranes are moist.      Pharynx: No oropharyngeal exudate or posterior oropharyngeal erythema.   Eyes:      " Extraocular Movements: Extraocular movements intact.   Cardiovascular:      Rate and Rhythm: Normal rate and regular rhythm.      Pulses: no weak pulses.           Dorsalis pedis pulses are 2+ on the right side and 2+ on the left side.        Posterior tibial pulses are 2+ on the right side and 2+ on the left side.      Heart sounds: Normal heart sounds.   Pulmonary:      Effort: Pulmonary effort is normal.      Breath sounds: Normal breath sounds.   Feet:      Right foot:      Skin integrity: No ulcer, skin breakdown, erythema, warmth, callus or dry skin.      Left foot:      Skin integrity: No ulcer, skin breakdown, erythema, warmth, callus or dry skin.   Neurological:      General: No focal deficit present.      Mental Status: He is alert and oriented to person, place, and time.   Psychiatric:         Mood and Affect: Mood normal.         Behavior: Behavior normal.         Diabetic Foot Exam    Patient's shoes and socks removed.    Right Foot/Ankle   Right Foot Inspection  Skin Exam: skin normal and skin intact. No dry skin, no warmth, no callus, no erythema, no maceration, no abnormal color, no pre-ulcer, no ulcer and no callus.     Toe Exam: ROM and strength within normal limits.     Sensory   Monofilament testing: intact    Vascular  Capillary refills: < 3 seconds  The right DP pulse is 2+. The right PT pulse is 2+.     Left Foot/Ankle  Left Foot Inspection  Skin Exam: skin normal and skin intact. No dry skin, no warmth, no erythema, no maceration, normal color, no pre-ulcer, no ulcer and no callus.     Toe Exam: ROM and strength within normal limits.     Sensory   Monofilament testing: intact    Vascular  Capillary refills: < 3 seconds  The left DP pulse is 2+. The left PT pulse is 2+.     Assign Risk Category  No deformity present  No loss of protective sensation  No weak pulses  Risk: 0        Administrative Statements

## 2024-07-13 LAB
ALBUMIN SERPL-MCNC: 4.8 G/DL (ref 3.8–4.9)
ALP SERPL-CCNC: 102 IU/L (ref 44–121)
ALT SERPL-CCNC: 23 IU/L (ref 0–44)
AST SERPL-CCNC: 26 IU/L (ref 0–40)
BASOPHILS # BLD AUTO: 0.1 X10E3/UL (ref 0–0.2)
BASOPHILS NFR BLD AUTO: 1 %
BILIRUB SERPL-MCNC: 0.6 MG/DL (ref 0–1.2)
BUN SERPL-MCNC: 22 MG/DL (ref 6–24)
BUN/CREAT SERPL: 23 (ref 9–20)
CALCIUM SERPL-MCNC: 10.1 MG/DL (ref 8.7–10.2)
CHLORIDE SERPL-SCNC: 99 MMOL/L (ref 96–106)
CHOLEST SERPL-MCNC: 309 MG/DL (ref 100–199)
CO2 SERPL-SCNC: 20 MMOL/L (ref 20–29)
CREAT SERPL-MCNC: 0.95 MG/DL (ref 0.76–1.27)
EGFR: 96 ML/MIN/1.73
EOSINOPHIL # BLD AUTO: 0.2 X10E3/UL (ref 0–0.4)
EOSINOPHIL NFR BLD AUTO: 3 %
ERYTHROCYTE [DISTWIDTH] IN BLOOD BY AUTOMATED COUNT: 16.9 % (ref 11.6–15.4)
GLOBULIN SER-MCNC: 2.6 G/DL (ref 1.5–4.5)
GLUCOSE SERPL-MCNC: 116 MG/DL (ref 70–99)
HBA1C MFR BLD: 5.4 % (ref 4.8–5.6)
HCT VFR BLD AUTO: 53.3 % (ref 37.5–51)
HDLC SERPL-MCNC: 40 MG/DL
HGB BLD-MCNC: 18.4 G/DL (ref 13–17.7)
IMM GRANULOCYTES # BLD: 0 X10E3/UL (ref 0–0.1)
IMM GRANULOCYTES NFR BLD: 0 %
LDL CALC COMMENT: ABNORMAL
LDLC SERPL CALC-MCNC: 240 MG/DL (ref 0–99)
LYMPHOCYTES # BLD AUTO: 2.4 X10E3/UL (ref 0.7–3.1)
LYMPHOCYTES NFR BLD AUTO: 29 %
MCH RBC QN AUTO: 32.7 PG (ref 26.6–33)
MCHC RBC AUTO-ENTMCNC: 34.5 G/DL (ref 31.5–35.7)
MCV RBC AUTO: 95 FL (ref 79–97)
MONOCYTES # BLD AUTO: 0.6 X10E3/UL (ref 0.1–0.9)
MONOCYTES NFR BLD AUTO: 8 %
NEUTROPHILS # BLD AUTO: 4.9 X10E3/UL (ref 1.4–7)
NEUTROPHILS NFR BLD AUTO: 59 %
PLATELET # BLD AUTO: 314 X10E3/UL (ref 150–450)
POTASSIUM SERPL-SCNC: 4.3 MMOL/L (ref 3.5–5.2)
PROT SERPL-MCNC: 7.4 G/DL (ref 6–8.5)
PSA SERPL-MCNC: 1.2 NG/ML (ref 0–4)
RBC # BLD AUTO: 5.62 X10E6/UL (ref 4.14–5.8)
SL AMB VLDL CHOLESTEROL CALC: 29 MG/DL (ref 5–40)
SODIUM SERPL-SCNC: 137 MMOL/L (ref 134–144)
T4 FREE SERPL-MCNC: 1.46 NG/DL (ref 0.82–1.77)
TESTOST FREE SERPL-MCNC: 8.7 PG/ML (ref 7.2–24)
TESTOST SERPL-MCNC: 343 NG/DL (ref 264–916)
TRIGL SERPL-MCNC: 152 MG/DL (ref 0–149)
TSH SERPL DL<=0.005 MIU/L-ACNC: 2.17 UIU/ML (ref 0.45–4.5)
WBC # BLD AUTO: 8.2 X10E3/UL (ref 3.4–10.8)

## 2024-07-24 ENCOUNTER — CONSULT (OUTPATIENT)
Dept: FAMILY MEDICINE CLINIC | Facility: CLINIC | Age: 54
End: 2024-07-24
Payer: COMMERCIAL

## 2024-07-24 VITALS
SYSTOLIC BLOOD PRESSURE: 118 MMHG | BODY MASS INDEX: 27.17 KG/M2 | HEIGHT: 73 IN | DIASTOLIC BLOOD PRESSURE: 80 MMHG | OXYGEN SATURATION: 95 % | HEART RATE: 99 BPM | WEIGHT: 205 LBS | TEMPERATURE: 97.4 F

## 2024-07-24 DIAGNOSIS — E55.9 VITAMIN D DEFICIENCY: ICD-10-CM

## 2024-07-24 DIAGNOSIS — E11.65 TYPE 2 DIABETES MELLITUS WITH HYPERGLYCEMIA, WITHOUT LONG-TERM CURRENT USE OF INSULIN (HCC): ICD-10-CM

## 2024-07-24 DIAGNOSIS — H33.21 DETACHED RETINA, RIGHT: ICD-10-CM

## 2024-07-24 DIAGNOSIS — H35.372 MACULAR PUCKERING, LEFT EYE: ICD-10-CM

## 2024-07-24 DIAGNOSIS — E03.9 ACQUIRED HYPOTHYROIDISM: ICD-10-CM

## 2024-07-24 DIAGNOSIS — C85.90 LYMPHOMA IN REMISSION (HCC): ICD-10-CM

## 2024-07-24 DIAGNOSIS — Z01.818 PREOPERATIVE CLEARANCE: Primary | ICD-10-CM

## 2024-07-24 DIAGNOSIS — E78.2 MIXED HYPERLIPIDEMIA: ICD-10-CM

## 2024-07-24 PROBLEM — M25.50 POLYARTHRALGIA: Status: ACTIVE | Noted: 2023-06-21

## 2024-07-24 PROBLEM — H33.22 DETACHED RETINA, LEFT: Status: ACTIVE | Noted: 2023-05-25

## 2024-07-24 PROBLEM — Z00.00 ANNUAL PHYSICAL EXAM: Status: RESOLVED | Noted: 2023-01-12 | Resolved: 2024-07-24

## 2024-07-24 PROCEDURE — 99214 OFFICE O/P EST MOD 30 MIN: CPT | Performed by: NURSE PRACTITIONER

## 2024-07-24 NOTE — PROGRESS NOTES
Ambulatory Visit- pt of Dr Mendoza - for preop clearance   Name: Alfonso An      : 1970      MRN: [id number]  Encounter Provider: DONNA Salas  Encounter Date: 2024   Encounter department: [facility]    Assessment & Plan   1. Preoperative clearance  Comments:  CLEARED FOR SURGERY   Attached labs EKG and stress test  2. Lymphoma in remission (HCC)  Comments:  stable denies any issues  3. Acquired hypothyroidism  Comments:  stable   TSH Within range  4. Type 2 diabetes mellitus with hyperglycemia, without long-term current use of insulin (HCC)  Comments:  stable  5. Mixed hyperlipidemia  6. Vitamin D deficiency  7. Macular puckering, left eye  8. Detached retina, right       History of Present Illness     Pt is a 53 yr old male   Presents in office in need for preoperative clearance   He is is a patient of Dr Mendoza who is currently on a leave   He is in need  for repair of retina tomorrow           Review of Systems   Constitutional:  Negative for fatigue, fever and unexpected weight change.   HENT:  Negative for congestion, hearing loss, sneezing and voice change.    Eyes:         Legally blind   Detached retina to the right preop  surgery tomorrow   Left eye blind    Respiratory:  Negative for cough and shortness of breath.    Cardiovascular:  Negative for chest pain and palpitations.   Gastrointestinal:  Negative for abdominal distention, abdominal pain, nausea and vomiting.   Endocrine: Negative.    Genitourinary:  Negative for difficulty urinating, flank pain and scrotal swelling.   Musculoskeletal:  Negative for arthralgias and myalgias.   Skin:  Negative for rash.   Neurological:  Negative for headaches.   Hematological:  Negative for adenopathy.   Psychiatric/Behavioral:  Negative for sleep disturbance and suicidal ideas. The patient is not nervous/anxious.      Pertinent Medical History   Reviewed         Medical History Reviewed by provider this  encounter:       Past Medical History   Past Medical History:   Diagnosis Date    Allergic Seasonal    Cancer (HCC)     Diabetes mellitus (HCC)     Disease of thyroid gland      Past Surgical History:   Procedure Laterality Date    CATARACT EXTRACTION, BILATERAL      EYE SURGERY  02/28/23 03/14/23    Cataract    HERNIA REPAIR      LIVER SURGERY      LYMPH NODE BIOPSY  1996    SPLENECTOMY, PARTIAL      TONSILLECTOMY       Family History   Problem Relation Age of Onset    Diabetes Mother     Cancer Mother     Heart disease Mother     Coronary artery disease Mother     COPD Mother     Arthritis Mother     Diabetes type II Mother     Heart disease Father     Diabetes Father     Cancer Father     Coronary artery disease Father     COPD Father     Arthritis Father     Diabetes type II Father     Hypertension Father     Hyperlipidemia Father     Lupus Sister     Autoimmune disease Sister         Lupus    Cancer Sister         Melanoma    Vision loss Maternal Grandfather     Prostate cancer Paternal Grandfather     Hearing loss Sister     Diabetes type II Sister     Bipolar disorder Brother     Diabetes type II Brother     Hypertension Brother      Current Outpatient Medications on File Prior to Visit   Medication Sig Dispense Refill    Acidophilus Lactobacillus CAPS       Ashwagandha 500 MG CAPS Take 1 capsule (500 mg total) by mouth Daily at 2am 100 capsule 1    Blood Glucose Monitoring Suppl (OneTouch Verio) w/Device KIT Use in the morning 1 kit 0    Cholecalciferol (Vitamin D3) 125 MCG (5000 UT) CAPS       glucose blood (OneTouch Verio) test strip Use as instructed- 2x daily 200 each 1    Lancets (onetouch ultrasoft) lancets Use as instructed- 2x day 200 each 1    levothyroxine 150 mcg tablet take 1 tablet by mouth once daily 90 tablet 3    Magnesium 400 MG CAPS       Milk Thistle 1000 MG CAPS Take 2,000 mg by mouth      Multiple Vitamin (multivitamin) capsule Take 1 capsule by mouth daily      Omega-3 Fatty Acids (fish  "oil) 1,000 mg Take 1,000 mg by mouth daily      Potassium Citrate,Elemental K, 99 MG CAPS       SYRINGE-NEEDLE, DISP, 3 ML (BD Eclipse Syringe/Needle) 23G X 1-1/2\" 3 ML MISC Use once a week 100 each 1    Testosterone Cypionate 200 MG/ML SOLN Inject 75 mg as directed every 7 days 10 mL 1    amoxicillin (AMOXIL) 500 mg capsule take 4 capsules by mouth 1 hour prior to appointment (Patient not taking: Reported on 7/24/2024)      castor oil Using topically (Patient not taking: Reported on 7/24/2024)      Testosterone Enanthate 75 MG/0.5ML SOAJ Inject 75 mg under the skin once a week (Patient not taking: Reported on 5/15/2024) 2 mL 5     No current facility-administered medications on file prior to visit.     Allergies   Allergen Reactions    Allyl Isothiocyanate Other (See Comments)    Other Nasal Congestion     Mustard    Keflex [Cephalexin] Rash      Current Outpatient Medications on File Prior to Visit   Medication Sig Dispense Refill    Acidophilus Lactobacillus CAPS       Ashwagandha 500 MG CAPS Take 1 capsule (500 mg total) by mouth Daily at 2am 100 capsule 1    Blood Glucose Monitoring Suppl (OneTouch Verio) w/Device KIT Use in the morning 1 kit 0    Cholecalciferol (Vitamin D3) 125 MCG (5000 UT) CAPS       glucose blood (OneTouch Verio) test strip Use as instructed- 2x daily 200 each 1    Lancets (onetouch ultrasoft) lancets Use as instructed- 2x day 200 each 1    levothyroxine 150 mcg tablet take 1 tablet by mouth once daily 90 tablet 3    Magnesium 400 MG CAPS       Milk Thistle 1000 MG CAPS Take 2,000 mg by mouth      Multiple Vitamin (multivitamin) capsule Take 1 capsule by mouth daily      Omega-3 Fatty Acids (fish oil) 1,000 mg Take 1,000 mg by mouth daily      Potassium Citrate,Elemental K, 99 MG CAPS       SYRINGE-NEEDLE, DISP, 3 ML (BD Eclipse Syringe/Needle) 23G X 1-1/2\" 3 ML MISC Use once a week 100 each 1    Testosterone Cypionate 200 MG/ML SOLN Inject 75 mg as directed every 7 days 10 mL 1    " "amoxicillin (AMOXIL) 500 mg capsule take 4 capsules by mouth 1 hour prior to appointment (Patient not taking: Reported on 2024)      castor oil Using topically (Patient not taking: Reported on 2024)      Testosterone Enanthate 75 MG/0.5ML SOAJ Inject 75 mg under the skin once a week (Patient not taking: Reported on 5/15/2024) 2 mL 5     No current facility-administered medications on file prior to visit.      Social History     Tobacco Use    Smoking status: Former     Current packs/day: 0.00     Average packs/day: 2.0 packs/day for 26.5 years (53.1 ttl pk-yrs)     Types: Cigarettes     Start date: 1984     Quit date: 7/15/2010     Years since quittin.0     Passive exposure: Past    Smokeless tobacco: Never   Vaping Use    Vaping status: Never Used   Substance and Sexual Activity    Alcohol use: Not Currently    Drug use: Not Currently    Sexual activity: Not Currently     Partners: Male     Birth control/protection: None     Objective     /80 (BP Location: Right arm, Patient Position: Sitting, Cuff Size: Adult)   Pulse 99   Temp (!) 97.4 °F (36.3 °C)   Ht 6' 1\" (1.854 m)   Wt 93 kg (205 lb)   SpO2 95%   BMI 27.05 kg/m²     Physical Exam  Vitals and nursing note reviewed.   Constitutional:       Appearance: Normal appearance.   HENT:      Head: Atraumatic.   Eyes:      Comments: Legally blind    Cardiovascular:      Rate and Rhythm: Normal rate and regular rhythm.      Pulses: Normal pulses.      Heart sounds: Normal heart sounds.   Pulmonary:      Effort: Pulmonary effort is normal.      Breath sounds: Normal breath sounds.   Abdominal:      General: Abdomen is flat.      Palpations: Abdomen is soft.   Musculoskeletal:      Cervical back: Normal range of motion.      Right lower leg: No edema.      Left lower leg: No edema.   Skin:     General: Skin is warm.      Capillary Refill: Capillary refill takes less than 2 seconds.   Neurological:      Mental Status: He is alert and oriented " to person, place, and time.   Psychiatric:         Mood and Affect: Mood normal.         Behavior: Behavior normal.      Contains abnormal data CBC and differential  Order: [id number]   Status: Final result       Visible to patient: Yes (seen)       Next appt: 07/30/2024 at 01:20 PM in Endocrinology (Yvette Padilla MD)    1 Result Note         Component  Ref Range & Units 7/12/24  9:01 AM 3/29/24  8:04 AM 12/15/23  8:57 AM 9/8/23  8:26 AM   White Blood Cell Count  3.4 - 10.8 x10E3/uL 8.2 9.6 9.5 9.2   Red Blood Cell Count  4.14 - 5.80 x10E6/uL 5.62 5.99 High  5.38 5.46   Hemoglobin  13.0 - 17.7 g/dL 18.4 High  19.8 High  17.8 High  18.3 High    HCT  37.5 - 51.0 % 53.3 High  59.2 High  51.8 High  54.5 High    MCV  79 - 97 fL 95 99 High  96 100 High    MCH  26.6 - 33.0 pg 32.7 33.1 High  33.1 High  33.5 High    MCHC  31.5 - 35.7 g/dL 34.5 33.4 34.4 33.6   RDW  11.6 - 15.4 % 16.9 High  14.3 14.7 15.3   Platelet Count  150 - 450 x10E3/uL 314 359 341 367   Neutrophils  Not Estab. % 59 60 66 66   Lymphocytes  Not Estab. % 29 29 22 21   Monocytes  Not Estab. % 8 8 8 10   Eosinophils  Not Estab. % 3 2 3 2   Basophils PCT  Not Estab. % 1 1 1 1   Neutrophils (Absolute)  1.4 - 7.0 x10E3/uL 4.9 5.7 6.2 6.1   Lymphocytes (Absolute)  0.7 - 3.1 x10E3/uL 2.4 2.8 2.1 1.9   Monocytes (Absolute)  0.1 - 0.9 x10E3/uL 0.6 0.8 0.8 0.9   Eosinophils (Absolute)  0.0 - 0.4 x10E3/uL 0.2 0.2 0.3 0.2   Basophils ABS  0.0 - 0.2 x10E3/uL 0.1 0.1 0.1 0.1   Immature Granulocytes  Not Estab. % 0 0 0 0   Immature Granulocytes (Absolute)  0.0 - 0.1 x10E3/uL 0.0 0.0 0.0 0.0                 Contains abnormal data Comprehensive metabolic panel  Order: [id number]   Status: Final result       Visible to patient: Yes (seen)       Next appt: 07/30/2024 at 01:20 PM in Endocrinology (Yvette Padilla MD)    1 Result Note       1 HM Topic           Component  Ref Range & Units 7/12/24  9:01 AM 3/29/24  8:04 AM 12/15/23  8:57 AM 9/8/23  8:26 AM 6/12/23  8:29 AM 1/12/23   2:30 PM   Glucose, Random  70 - 99 mg/dL 116 High  93 127 High  98 380 High     BUN  6 - 24 mg/dL 22 19 16 13 11 18 R   Creatinine  0.76 - 1.27 mg/dL 0.95 1.04 0.82 0.91 0.78 0.98 R, CM   eGFR  >59 mL/min/1.73 96 86 106 102 108 88 R   SL AMB BUN/CREATININE RATIO  9 - 20 23 High  18 20 14 14    Sodium  134 - 144 mmol/L 137 142 140 138 139 138 R   Potassium  3.5 - 5.2 mmol/L 4.3 4.4 4.4 4.7 4.5 4.4 R   Chloride  96 - 106 mmol/L 99 99 101 100 98 109 High  R   CO2  20 - 29 mmol/L 20 21 18 Low  22 23 23 R   CALCIUM  8.7 - 10.2 mg/dL 10.1 10.3 High  10.0 10.4 High  CM 9.8    Protein, Total  6.0 - 8.5 g/dL 7.4 7.7 7.3 7.4 7.4 8.1 R   Albumin  3.8 - 4.9 g/dL 4.8 4.9 4.9 5.0 High  4.9 4.4 R   Globulin, Total  1.5 - 4.5 g/dL 2.6 2.8 2.4 2.4 2.5    TOTAL BILIRUBIN  0.0 - 1.2 mg/dL 0.6 0.3 0.5 0.2 0.4 0.53 R, CM   Alk Phos Isoenzymes  44 - 121 IU/L 102 75 85 82 102    AST  0 - 40 IU/L 26 25 23 23 39 37 R, CM   ALT  0 - 44 IU/L 23 23 23 24 66 High  58 R, CM              Narrative    Performed at:  [address] - [facility]  [id number]  : Rosalina Stark MD, Phone:  [phone]      Specimen Collected: 07/12/24  9:01 AM Last Resulted: 07/13/24           Contains abnormal data Lipid panel  Order: [id number]   Status: Final result       Visible to patient: Yes (seen)       Next appt: 07/30/2024 at 01:20 PM in Endocrinology (Yvette Padilla MD)    1 Result Note          Component  Ref Range & Units 7/12/24  9:01 AM 3/29/24  8:04 AM 12/15/23  8:57 AM 9/8/23  8:26 AM 1/12/23  2:30 PM   Cholesterol, Total  100 - 199 mg/dL 309 High  330 High  311 High  274 High  255 High  R, CM   Triglycerides  0 - 149 mg/dL 152 High  201 High  156 High  150 High  229 High  R, CM   HDL  >39 mg/dL 40 39 Low  43 37 Low  45 R, CM   VLDL Cholesterol Calculated  5 - 40 mg/dL 29 41 High  30 28    LDL Calculated  0 - 99 mg/dL 240 High  250 High  238 High  209 High  164 High  R, CM   LDL CALC COMMENT Comment       Comment: Consider  evaluating for Familial Hypercholesterolemia(FH), if  clinically indicated.              Narrative    Performed at:  [address]  [facility]  [id number]  : Rosalina Stark MD, Phone:  [phone]      Specimen Collected: 07/12/24  9:01 AM         Testosterone, free, total  Order: [id number]   Status: Final result       Visible to patient: Yes (seen)       Next appt: 07/30/2024 at 01:20 PM in Endocrinology (Yvette Padilla MD)    1 Result Note            Component  Ref Range & Units 7/12/24  9:01 AM 3/29/24  8:04 AM 12/15/23  8:57 AM 9/8/23  8:26 AM 5/18/23  8:15 AM 3/30/23  8:39 AM 3/3/23 10:35 AM   TESTOSTERONE TOTAL  264 - 916 ng/dL 343 793  Low  .1 R,  Low  CM 61 Low   Low  CM   Comment: Adult male reference interval is based on a population of  healthy nonobese males (BMI <30) between 19 and 39 years old.  Jasmyne et.al. JCEM 2017,102;3204-5149. PMID: [id number].   Testosterone, Free  7.2 - 24.0 pg/mL 8.7 15.5 6.2 Low  13.4 9.3 5.4 Low  5.8 Low       Hemoglobin A1c (w/out EAG)  Order: [id number]   Status: Final result       Visible to patient: Yes (seen)       Next appt: 07/30/2024 at 01:20 PM in Endocrinology (Yvette Padilla MD)    1 Result Note       1 HM Topic           Component  Ref Range & Units 7/12/24  9:01 AM 3/29/24  8:04 AM 12/15/23  8:57 AM 9/8/23  8:26 AM 6/12/23  8:29 AM 1/12/23  2:30 PM   Hemoglobin A1C  4.8 - 5.6 % 5.4 5.5 CM 5.8 High  CM 5.9 High  CM 9.7 High  CM 6.2 High  R   Comment:          Prediabetes: 5.7 - 6.4           Diabetes: >6.4           Glycemic control for adults with diabetes: <7.0      T4, free  Order: [id number]   Status: Final result       Visible to patient: Yes (seen)       Next appt: 07/30/2024 at 01:20 PM in Endocrinology (Yvette Padilla MD)    1 Result Note          Component  Ref Range & Units 7/12/24  9:01 AM 3/29/24  8:04 AM 12/15/23  8:57 AM 6/20/23  8:33 AM 3/30/23  8:39 AM   Free t4  0.82 - 1.77 ng/dL 1.46 1.61 1.30  1.40 1.09 R, CM              Narrative    Performed at:  [address] [place] [address]  [id number]  : Rosalina Stark MD, Phone:  [phone]      Specimen Collected: 07/12/24  9:01 AM Last Resulted: 07/13/24 12:05 PM        Lab Flowsheet        Order Details        View Encounter        Lab and Collection Details        Routing        Result History     View All Conversations on this Encounter           TSH, 3rd generation  Order: [id number]   Status: Final result       Visible to patient: Yes (seen)       Next appt: 07/30/2024 at 01:20 PM in Endocrinology (Yvette Padilla MD)    1 Result Note         Component  Ref Range & Units 7/12/24  9:01 AM 3/29/24  8:04 AM 12/15/23  8:57 AM 6/29/23  8:57 AM   TSH  0.450 - 4.500 uIU/mL 2.170 1.800 3.080 1.700              Narrative    Performed at:  [address] [place] [address]  [id number]  : Rosalina Stark MD, Phone:  [phone]      Specimen Collected: 07/12/24  9:01 AM Last Resulted: 07/13/24 12:05 PM         PSA Total, Diagnostic  Order: [id number]   Status: Final result       Visible to patient: Yes (seen)       Next appt: 07/30/2024 at 01:20 PM in Endocrinology (Yvette Padilla MD)    1 Result Note       Component  Ref Range & Units 7/12/24  9:01 AM 9/8/23  8:26 AM   Prostate Specific Antigen Total  0.0 - 4.0 ng/mL 1.2 1.4 CM      Stress test only, exercise  Order: [id number]   Status: Final result       Visible to patient: Yes (seen)       Dx: Palpitations; Coronary artery calcifi...    1 Result Note  Details    Reading Physician Reading Date Result Priority   Alfonso Packer MD  [phone] 9/28/2023 Routine     Result Text       Stress ECG: No ST deviation is noted. The ECG was negative for ischemia.    Normal exercise EKG at 13.4 METs and 95% MPHR.        Resting ECG    Resting ECG ECG is normal. Resting ECG shows no ST-segment deviation.   Stress Findings    Stress Findings A Charanjit protocol stress test was  performed. The patient reached stage 4.0 of the protocol after exercising for 10 min and 31 sec and had a maximal HR of 162 bpm (95 % of MPHR) and 13.4 METS. The patient experienced no angina during the test. The patient achieved the target heart rate and achieved their maximal exercise threshold. The patient reported bilateral hand tingling during the stress test.   Stress ECG    Stress ECG No ST deviation is noted. There were no arrhythmias during stress. The ECG was negative for ischemia.   Stress Measurements    Baseline Vitals   Baseline HR 93 bpm         Baseline /88 mmHg         O2 sat rest 99 %         Peak Stress Vitals   Stress peak  bpm         Post peak  mmHg         O2 sat peak 99 %         Max HR Percent 95 %         Max  bpm         Recovery Vitals   Recovery  bpm         Recovery /80 mmHg         O2 sat recovery 98 %          Exercise Data   Max  bpm         Exercise duration (min) 10 min         Exercise duration (sec) 31 sec         Estimated workload 13.4 METS         Stress Stage Reached 4         Angina Index 0         Rate Pressure Product 31,104              All Measurements           Exam Ended: 09/28/23  1:38 PM Last Resulted: 09/28/23  1:52 PM           Administrative Statements   I have spent a total time of 35  minutes in caring for this patient on the day of the visit/encounter including Diagnostic results, Prognosis, Risks and benefits of tx options, Instructions for management, Patient and family education, Importance of tx compliance, Risk factor reductions, Impressions, Counseling / Coordination of care, Documenting in the medical record, Reviewing / ordering tests, medicine, procedures  , Obtaining or reviewing history  , and Communicating with other healthcare professionals .

## 2024-08-10 DIAGNOSIS — E11.65 TYPE 2 DIABETES MELLITUS WITH HYPERGLYCEMIA, WITHOUT LONG-TERM CURRENT USE OF INSULIN (HCC): ICD-10-CM

## 2024-08-10 DIAGNOSIS — E34.9 TESTOSTERONE DEFICIENCY: ICD-10-CM

## 2024-08-11 RX ORDER — BLOOD-GLUCOSE METER
KIT MISCELLANEOUS
Qty: 1 KIT | Refills: 0 | Status: SHIPPED | OUTPATIENT
Start: 2024-08-11

## 2024-08-11 RX ORDER — LANCETS 33 GAUGE
EACH MISCELLANEOUS
Qty: 100 EACH | Refills: 3 | Status: SHIPPED | OUTPATIENT
Start: 2024-08-11

## 2024-08-11 RX ORDER — BLOOD SUGAR DIAGNOSTIC
STRIP MISCELLANEOUS
Qty: 200 STRIP | Refills: 2 | Status: SHIPPED | OUTPATIENT
Start: 2024-08-11

## 2024-08-12 RX ORDER — TESTOSTERONE CYPIONATE 200 MG/ML
INJECTION, SOLUTION INTRAMUSCULAR
Qty: 10 ML | Refills: 1 | Status: SHIPPED | OUTPATIENT
Start: 2024-08-12

## 2024-10-27 DIAGNOSIS — E34.9 TESTOSTERONE DEFICIENCY: ICD-10-CM

## 2024-10-28 NOTE — TELEPHONE ENCOUNTER
Patient Id Prescription # Filled Written Drug Label Qty Days Strength MME** Prescriber Pharmacy Payment REFILL #/Auth State Detail   1 [id number] 04/15/2024 04/12/2024 Xyosted (Solution) 2.0 28 75 MG/0.5 ML NA BEVERLY KONG [email or web address] [employer] 0 / 5 [place]    1 [id number] 02/28/2024 02/28/2024 Testosterone Cypionate (Oil) 10.0 90 200 MG/1 ML NA JOEL CURIEL [email or web address] 0 / 1 [place]

## 2024-10-29 NOTE — TELEPHONE ENCOUNTER
Please call patient to schedule an appointment. He was last seen in April and was due to be seen in July

## 2024-10-29 NOTE — TELEPHONE ENCOUNTER
Patient needs a follow-up.  Also can we verify what testosterone he is taking.  It looks like a changed him to the Xyosted pen at last office visit.

## 2024-11-13 RX ORDER — TESTOSTERONE CYPIONATE 200 MG/ML
200 INJECTION, SOLUTION INTRAMUSCULAR
Qty: 10 ML | Refills: 0 | Status: SHIPPED | OUTPATIENT
Start: 2024-11-13

## 2024-11-20 ENCOUNTER — OFFICE VISIT (OUTPATIENT)
Dept: FAMILY MEDICINE CLINIC | Facility: CLINIC | Age: 54
End: 2024-11-20
Payer: COMMERCIAL

## 2024-11-20 VITALS
HEART RATE: 83 BPM | WEIGHT: 205.38 LBS | HEIGHT: 73 IN | OXYGEN SATURATION: 99 % | DIASTOLIC BLOOD PRESSURE: 62 MMHG | SYSTOLIC BLOOD PRESSURE: 100 MMHG | BODY MASS INDEX: 27.22 KG/M2 | TEMPERATURE: 98.1 F

## 2024-11-20 DIAGNOSIS — H33.23 DETACHED RETINA, BILATERAL: ICD-10-CM

## 2024-11-20 DIAGNOSIS — E11.65 TYPE 2 DIABETES MELLITUS WITH HYPERGLYCEMIA, WITHOUT LONG-TERM CURRENT USE OF INSULIN (HCC): Primary | ICD-10-CM

## 2024-11-20 PROCEDURE — 99213 OFFICE O/P EST LOW 20 MIN: CPT | Performed by: STUDENT IN AN ORGANIZED HEALTH CARE EDUCATION/TRAINING PROGRAM

## 2024-11-20 NOTE — PROGRESS NOTES
"Name: Alfonso An      : 1970      MRN: [id number]  Encounter Provider: Kanchan Mendoza DO  Encounter Date: 2024   Encounter department: [facility] PRIMARY CARE  :  Assessment & Plan  Type 2 diabetes mellitus with hyperglycemia, without long-term current use of insulin (HCC)  Diet controlled, patient doing very well  Repeat blood work has been ordered to be completed prior to next visit.  Lab Results   Component Value Date    HGBA1C 5.4 2024       Orders:    Hemoglobin A1C; Future    Comprehensive metabolic panel; Future    CBC and differential; Future    Albumin / creatinine urine ratio; Future    Lipid Panel with Direct LDL reflex; Future    TSH, 3rd generation with Free T4 reflex; Future    Detached retina, bilateral  Following with Optho, Having monthly visits.   Legally blind and ambulating with walking stick  Will be filing for disability   Patient has good support at home                History of Present Illness     Patient presents today for DM f/u  States that he is now legally blind due to subsequent retinal detachment in right eye. Will be filing for disability      Review of Systems   Constitutional:  Negative for chills and fever.   Eyes:  Positive for visual disturbance.   Respiratory:  Negative for cough and shortness of breath.    Cardiovascular:  Negative for chest pain and palpitations.   Neurological:  Negative for dizziness and headaches.          Objective   /62 (BP Location: Left arm, Patient Position: Sitting, Cuff Size: Large)   Pulse 83   Temp 98.1 °F (36.7 °C) (Temporal)   Ht 6' 1\" (1.854 m)   Wt 93.2 kg (205 lb 6 oz)   SpO2 99%   BMI 27.10 kg/m²      Physical Exam  Vitals reviewed.   Constitutional:       Appearance: Normal appearance.   HENT:      Head: Normocephalic and atraumatic.      Mouth/Throat:      Mouth: Mucous membranes are moist.      Pharynx: No oropharyngeal exudate or posterior oropharyngeal erythema. "   Cardiovascular:      Rate and Rhythm: Normal rate and regular rhythm.      Heart sounds: Normal heart sounds.   Pulmonary:      Effort: Pulmonary effort is normal.      Breath sounds: Normal breath sounds.   Musculoskeletal:         General: No swelling or tenderness.   Neurological:      General: No focal deficit present.      Mental Status: He is alert and oriented to person, place, and time.

## 2024-11-20 NOTE — ASSESSMENT & PLAN NOTE
Diet controlled, patient doing very well  Repeat blood work has been ordered to be completed prior to next visit.  Lab Results   Component Value Date    HGBA1C 5.4 07/12/2024       Orders:    Hemoglobin A1C; Future    Comprehensive metabolic panel; Future    CBC and differential; Future    Albumin / creatinine urine ratio; Future    Lipid Panel with Direct LDL reflex; Future    TSH, 3rd generation with Free T4 reflex; Future

## 2024-11-20 NOTE — ASSESSMENT & PLAN NOTE
Following with Optho, Having monthly visits.   Legally blind and ambulating with walking stick  Will be filing for disability   Patient has good support at home

## 2024-11-21 ENCOUNTER — TELEMEDICINE (OUTPATIENT)
Dept: ENDOCRINOLOGY | Facility: HOSPITAL | Age: 54
End: 2024-11-21
Payer: COMMERCIAL

## 2024-11-21 DIAGNOSIS — E34.9 TESTOSTERONE DEFICIENCY: Primary | ICD-10-CM

## 2024-11-21 PROCEDURE — 99214 OFFICE O/P EST MOD 30 MIN: CPT | Performed by: PHYSICIAN ASSISTANT

## 2024-11-21 NOTE — PATIENT INSTRUCTIONS
Continue testosterone 250 mg every 2 weeks.    Continue levothyroxine 150 mcg daily.    Continue with lifestyle to help with glucose levels.    Contact the office with any concerns or questions.    Follow-up in 6 months with labwork completed prior to visit.

## 2024-11-21 NOTE — PROGRESS NOTES
Virtual Regular Visit  Name: Alfonso An      : 1970      MRN: [id number]  Encounter Provider: Alvaro Matos PA-C  Encounter Date: 2024   Encounter department: [facility]      Verification of patient location:  Patient is located at Home in the following state in which I hold an active license [place] :  Assessment & Plan    1.  Hypothyroidism: Most recent thyroid lab work came back normal.  Clinically and biochemically euthyroid.  At this time he will continue with levothyroxine 150 mcg daily.  If there is any change in symptoms, please contact the office.  Otherwise he will follow-up in 6 months with labwork completed prior to visit.     2.  Hypogonadism: Was switched to Xyosted after last office visit, but had side effects with medication.  Has switched back to testosterone cypionate at this time.  He will continue with 250 mg every 2 weeks.  Asked him to repeat testosterone levels prior to next office visit.     3.  Type 2 diabetes: Most recent hemoglobin A1c is 5.4.  Doing well with diet control.  Continue with lifestyle modifications to help improve glucose levels.  Continue checking fasting blood sugar daily.  Contact the office if there is any concerns with glucose levels.     4.  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  Treatment options have been discussed in the past and currently have been deferred.  Will bring up with Dr. Padilla at next office visit.     5.  Hyperlipidemia: Cholesterol levels continue to increase, this could be due to testosterone therapy.  He will continue with milk thistle and fish oil.  Once again did discuss possibly utilizing red rice yeast.  Does not want to do any prescription medications at this time.    Encounter provider Alvaro Matos PA-C    The patient was identified by name and date of birth. Alfonso An was informed that this is a telemedicine  visit and that the visit is being conducted through the Epic Embedded platform. He agrees to proceed..  My office door was closed. No one else was in the room.  He acknowledged consent and understanding of privacy and security of the video platform. The patient has agreed to participate and understands they can discontinue the visit at any time.    Patient is aware this is a billable service.     History of Present Illness     Alfonso nA is a 53 y.o. year old male with history of lymphoma in 1996 tx with radiation and chemotherapy- neck to pelvis who was then diagnosed with hypothyroidism in 2009 on levothyroxine, hypogonadism on testosterone replacement, growth hormone deficiency, and type 2 diabetes diet controlled.  Other PMHx of ?hemachromatosis, NAFLD.  He presents today for routine follow-up.     Hypogonadism:-Previously on AndroGel, but was consistently having low testosterone on highest dose.  Was switched to testosterone cypionate which has significantly improved his testosterone, however this has been causing issues with hemoglobin/hematocrit and lipid levels.  He was switched to Xyosted after last office visit, but had side effects with medication and switch back to testosterone cypionate is doing 250 mg every 2 weeks.  He does have family members fill his syringe as there was vision concerns and that is why he was switched to the Xyosted.  Continues to have low energy though.  Also has a low libido.  Denies any headaches, chest pain, pain or swelling in lower extremities.     Hypothyroidism:-Is currently on levothyroxine 150 mcg daily.  Taking medication appropriately.  TSH and free T4 completed July 12, 2024. Feels tired and no energy as noted above.  Denies any heat or cold intolerance, palpitations, abdominal pain, diarrhea or constipation, tremors.     Diabetes:- patient reports previously diabetes was under control. Previously on metformin, reports was on keto diet and lost weight and diabetes  was under control.  Continues to lose weight, but has a hard time keeping the weight off.  Pancreatic cancer, FELIX, Cushing's has been ruled out in the past.  Currently doing well with diet control.  Had side effects with metformin in the past.  Has had retinal detachment and surgery completed June 2023.  Fortunately continues to have issues with retinal detachments and had another surgery August 2024.  At this time he is legally blind.  Is following up with ophthalmology at [facility].  Last diabetic foot exam was completed June 2023.  Currently checking glucose levels on a daily basis.  States blood sugars have been doing well and typically is in the low 100s or less.     Hyperlipidemia:- elevated level in past and wanted to work on lifestyle changes. Patient taking milk thistle. Does not want any other medications given hx of myalgia on statin/zetia.  Continues to try to make lifestyle modifications to help improve lipid levels.  Does believe part of the issue may be due to his testosterone treatment.     Low GH:- Patient previously found to have low IGF-1 level 55 03/23 repeat, IGF 34 with GH 0.1. Glucagon stim testing done which was positive for GH def. Does report tiredness and weak muscles. But also reports history of increase scar tissue formation     Family hx and social hx as below     Review of Systems   Constitutional:  Positive for fatigue. Negative for activity change, appetite change and unexpected weight change.        Difficulty with weight loss.   HENT:  Negative for trouble swallowing.    Eyes:  Positive for visual disturbance (Retinal detachment).   Respiratory:  Negative for chest tightness and shortness of breath.    Cardiovascular:  Negative for chest pain, palpitations and leg swelling.   Gastrointestinal:  Negative for abdominal pain, diarrhea, nausea and vomiting.   Endocrine: Negative for cold intolerance, heat intolerance, polydipsia, polyphagia and polyuria.   Genitourinary:  Negative  for frequency.        Decreased libido   Musculoskeletal:  Positive for myalgias.   Skin:  Negative for rash and wound.   Neurological:  Negative for dizziness, weakness, light-headedness, numbness and headaches.   Psychiatric/Behavioral:  Negative for dysphoric mood and sleep disturbance. The patient is not nervous/anxious.        Objective   There were no vitals taken for this visit.    Physical Exam  Constitutional:       General: He is not in acute distress.     Appearance: Normal appearance. He is not diaphoretic.   HENT:      Head: Normocephalic and atraumatic.   Eyes:      General: No scleral icterus.     Conjunctiva/sclera: Conjunctivae normal.   Pulmonary:      Effort: No respiratory distress (No audible wheezes).   Musculoskeletal:      Cervical back: Normal range of motion.   Neurological:      Mental Status: He is alert and oriented to person, place, and time. Mental status is at baseline.   Psychiatric:         Mood and Affect: Mood normal.         Behavior: Behavior normal.         Thought Content: Thought content normal.         Visit Time  Total Visit Duration: 15 minutes

## 2024-12-11 ENCOUNTER — TELEPHONE (OUTPATIENT)
Age: 54
End: 2024-12-11

## 2024-12-11 DIAGNOSIS — E34.9 TESTOSTERONE DEFICIENCY: ICD-10-CM

## 2024-12-11 DIAGNOSIS — E03.9 HYPOTHYROIDISM, UNSPECIFIED TYPE: ICD-10-CM

## 2024-12-11 NOTE — TELEPHONE ENCOUNTER
Heriberto from [facility] mail service pharmacy called in regards to patients testosterone cypionate (DEPO-TESTOSTERONE) 200 mg/mL SOLN.    States due to patients condition he would need mail order services.    Requested that all prescriptions be e-scribed to [facility] mail [facility] pharmacy.    He provided the following info:  [facility] pharmacy -  escribe info - address [address]                       [place] [address] [place] - [id number]  phone number [phone]  fax [phone]    Please advise if needed.

## 2024-12-12 RX ORDER — TESTOSTERONE CYPIONATE 200 MG/ML
200 INJECTION, SOLUTION INTRAMUSCULAR
Qty: 10 ML | Refills: 2 | Status: SHIPPED | OUTPATIENT
Start: 2024-12-12

## 2024-12-13 RX ORDER — LEVOTHYROXINE SODIUM 150 UG/1
150 TABLET ORAL DAILY
Qty: 90 TABLET | Refills: 3 | Status: SHIPPED | OUTPATIENT
Start: 2024-12-13

## 2025-02-08 LAB
ALBUMIN SERPL-MCNC: 4.9 G/DL (ref 3.8–4.9)
ALP SERPL-CCNC: 106 IU/L (ref 44–121)
ALT SERPL-CCNC: 22 IU/L (ref 0–44)
AST SERPL-CCNC: 24 IU/L (ref 0–40)
BASOPHILS # BLD AUTO: 0.1 X10E3/UL (ref 0–0.2)
BASOPHILS NFR BLD AUTO: 1 %
BILIRUB SERPL-MCNC: 0.3 MG/DL (ref 0–1.2)
BUN SERPL-MCNC: 15 MG/DL (ref 6–24)
BUN/CREAT SERPL: 18 (ref 9–20)
CALCIUM SERPL-MCNC: 10 MG/DL (ref 8.7–10.2)
CHLORIDE SERPL-SCNC: 102 MMOL/L (ref 96–106)
CHOLEST SERPL-MCNC: 309 MG/DL (ref 100–199)
CO2 SERPL-SCNC: 20 MMOL/L (ref 20–29)
CREAT SERPL-MCNC: 0.82 MG/DL (ref 0.76–1.27)
EGFR: 105 ML/MIN/1.73
EOSINOPHIL # BLD AUTO: 0.1 X10E3/UL (ref 0–0.4)
EOSINOPHIL NFR BLD AUTO: 2 %
ERYTHROCYTE [DISTWIDTH] IN BLOOD BY AUTOMATED COUNT: 14 % (ref 11.6–15.4)
GLOBULIN SER-MCNC: 2.3 G/DL (ref 1.5–4.5)
GLUCOSE SERPL-MCNC: 133 MG/DL (ref 70–99)
HBA1C MFR BLD: 5.5 % (ref 4.8–5.6)
HCT VFR BLD AUTO: 45.4 % (ref 37.5–51)
HDLC SERPL-MCNC: 45 MG/DL
HGB BLD-MCNC: 15.7 G/DL (ref 13–17.7)
IMM GRANULOCYTES # BLD: 0 X10E3/UL (ref 0–0.1)
IMM GRANULOCYTES NFR BLD: 0 %
LDL CALC COMMENT: ABNORMAL
LDLC SERPL CALC-MCNC: 239 MG/DL (ref 0–99)
LYMPHOCYTES # BLD AUTO: 1.7 X10E3/UL (ref 0.7–3.1)
LYMPHOCYTES NFR BLD AUTO: 24 %
MCH RBC QN AUTO: 32.4 PG (ref 26.6–33)
MCHC RBC AUTO-ENTMCNC: 34.6 G/DL (ref 31.5–35.7)
MCV RBC AUTO: 94 FL (ref 79–97)
MONOCYTES # BLD AUTO: 0.5 X10E3/UL (ref 0.1–0.9)
MONOCYTES NFR BLD AUTO: 7 %
NEUTROPHILS # BLD AUTO: 4.7 X10E3/UL (ref 1.4–7)
NEUTROPHILS NFR BLD AUTO: 66 %
PLATELET # BLD AUTO: 343 X10E3/UL (ref 150–450)
POTASSIUM SERPL-SCNC: 4.3 MMOL/L (ref 3.5–5.2)
PROT SERPL-MCNC: 7.2 G/DL (ref 6–8.5)
RBC # BLD AUTO: 4.85 X10E6/UL (ref 4.14–5.8)
SL AMB VLDL CHOLESTEROL CALC: 25 MG/DL (ref 5–40)
SODIUM SERPL-SCNC: 139 MMOL/L (ref 134–144)
T4 FREE SERPL-MCNC: 1.32 NG/DL (ref 0.82–1.77)
TESTOST FREE SERPL-MCNC: 7 PG/ML (ref 7.2–24)
TESTOST SERPL-MCNC: 305 NG/DL (ref 264–916)
TRIGL SERPL-MCNC: 134 MG/DL (ref 0–149)
TSH SERPL DL<=0.005 MIU/L-ACNC: 0.47 UIU/ML (ref 0.45–4.5)
WBC # BLD AUTO: 7 X10E3/UL (ref 3.4–10.8)

## 2025-02-10 ENCOUNTER — RESULTS FOLLOW-UP (OUTPATIENT)
Dept: ENDOCRINOLOGY | Facility: HOSPITAL | Age: 55
End: 2025-02-10

## 2025-02-10 NOTE — TELEPHONE ENCOUNTER
Patient called and stated he does his injection on Tuesday and goes for his labs on a Friday and stated that Is how Dr. Padilla instructed him to do so. He said he is unable to drive and is deemed legally blind at this time. He said his friend will be with him soon and he will give us a call back to schedule his f/u. He did say if it is urgent he has an appt next week 2/17 at another doctors office if something needs to be urgently taken care of.

## 2025-02-11 LAB
ALBUMIN SERPL-MCNC: 4.8 G/DL (ref 3.8–4.9)
ALBUMIN/CREAT UR: 3 MG/G CREAT (ref 0–29)
ALP SERPL-CCNC: 103 IU/L (ref 44–121)
ALT SERPL-CCNC: 21 IU/L (ref 0–44)
AST SERPL-CCNC: 26 IU/L (ref 0–40)
BASOPHILS # BLD AUTO: 0 X10E3/UL (ref 0–0.2)
BASOPHILS NFR BLD AUTO: 1 %
BILIRUB SERPL-MCNC: 0.3 MG/DL (ref 0–1.2)
BUN SERPL-MCNC: 16 MG/DL (ref 6–24)
BUN/CREAT SERPL: 19 (ref 9–20)
CALCIUM SERPL-MCNC: 10 MG/DL (ref 8.7–10.2)
CHLORIDE SERPL-SCNC: 105 MMOL/L (ref 96–106)
CHOLEST SERPL-MCNC: 302 MG/DL (ref 100–199)
CO2 SERPL-SCNC: 20 MMOL/L (ref 20–29)
CREAT SERPL-MCNC: 0.84 MG/DL (ref 0.76–1.27)
CREAT UR-MCNC: 142.9 MG/DL
EGFR: 104 ML/MIN/1.73
EOSINOPHIL # BLD AUTO: 0.1 X10E3/UL (ref 0–0.4)
EOSINOPHIL NFR BLD AUTO: 2 %
ERYTHROCYTE [DISTWIDTH] IN BLOOD BY AUTOMATED COUNT: 14 % (ref 11.6–15.4)
GLOBULIN SER-MCNC: 2.6 G/DL (ref 1.5–4.5)
GLUCOSE SERPL-MCNC: 132 MG/DL (ref 70–99)
HBA1C MFR BLD: 5.5 % (ref 4.8–5.6)
HCT VFR BLD AUTO: 46.3 % (ref 37.5–51)
HDLC SERPL-MCNC: 44 MG/DL
HGB BLD-MCNC: 15.5 G/DL (ref 13–17.7)
IMM GRANULOCYTES # BLD: 0 X10E3/UL (ref 0–0.1)
IMM GRANULOCYTES NFR BLD: 0 %
LDL CALC COMMENT: ABNORMAL
LDLC SERPL CALC-MCNC: 233 MG/DL (ref 0–99)
LYMPHOCYTES # BLD AUTO: 1.6 X10E3/UL (ref 0.7–3.1)
LYMPHOCYTES NFR BLD AUTO: 23 %
MCH RBC QN AUTO: 32 PG (ref 26.6–33)
MCHC RBC AUTO-ENTMCNC: 33.5 G/DL (ref 31.5–35.7)
MCV RBC AUTO: 96 FL (ref 79–97)
MICROALBUMIN UR-MCNC: 3.7 UG/ML
MONOCYTES # BLD AUTO: 0.5 X10E3/UL (ref 0.1–0.9)
MONOCYTES NFR BLD AUTO: 7 %
NEUTROPHILS # BLD AUTO: 4.7 X10E3/UL (ref 1.4–7)
NEUTROPHILS NFR BLD AUTO: 67 %
PLATELET # BLD AUTO: 336 X10E3/UL (ref 150–450)
POTASSIUM SERPL-SCNC: 4.4 MMOL/L (ref 3.5–5.2)
PROT SERPL-MCNC: 7.4 G/DL (ref 6–8.5)
PSA SERPL-MCNC: 1 NG/ML (ref 0–4)
RBC # BLD AUTO: 4.84 X10E6/UL (ref 4.14–5.8)
SL AMB VLDL CHOLESTEROL CALC: 25 MG/DL (ref 5–40)
SODIUM SERPL-SCNC: 141 MMOL/L (ref 134–144)
TRIGL SERPL-MCNC: 136 MG/DL (ref 0–149)
TSH SERPL DL<=0.005 MIU/L-ACNC: 0.45 UIU/ML (ref 0.45–4.5)
WBC # BLD AUTO: 7 X10E3/UL (ref 3.4–10.8)

## 2025-02-12 ENCOUNTER — RESULTS FOLLOW-UP (OUTPATIENT)
Dept: FAMILY MEDICINE CLINIC | Facility: CLINIC | Age: 55
End: 2025-02-12

## 2025-02-19 ENCOUNTER — TELEPHONE (OUTPATIENT)
Dept: ENDOCRINOLOGY | Facility: HOSPITAL | Age: 55
End: 2025-02-19

## 2025-02-19 NOTE — TELEPHONE ENCOUNTER
The patient was wondering when he needs a follow up and if you would be ok with him being a virtual visit and he is not able to drive anymore.

## 2025-03-10 ENCOUNTER — OFFICE VISIT (OUTPATIENT)
Dept: FAMILY MEDICINE CLINIC | Facility: CLINIC | Age: 55
End: 2025-03-10
Payer: COMMERCIAL

## 2025-03-10 VITALS
OXYGEN SATURATION: 98 % | BODY MASS INDEX: 26.64 KG/M2 | DIASTOLIC BLOOD PRESSURE: 74 MMHG | HEART RATE: 78 BPM | WEIGHT: 201 LBS | RESPIRATION RATE: 18 BRPM | SYSTOLIC BLOOD PRESSURE: 112 MMHG | HEIGHT: 73 IN

## 2025-03-10 DIAGNOSIS — R42 DIZZINESS: ICD-10-CM

## 2025-03-10 DIAGNOSIS — Z00.00 ANNUAL PHYSICAL EXAM: Primary | ICD-10-CM

## 2025-03-10 DIAGNOSIS — M54.2 NECK PAIN ON LEFT SIDE: ICD-10-CM

## 2025-03-10 DIAGNOSIS — C85.90 NON-HODGKIN'S LYMPHOMA, UNSPECIFIED BODY REGION, UNSPECIFIED NON-HODGKIN LYMPHOMA TYPE (HCC): ICD-10-CM

## 2025-03-10 DIAGNOSIS — E23.0 HYPOPITUITARISM (HCC): ICD-10-CM

## 2025-03-10 DIAGNOSIS — E11.65 TYPE 2 DIABETES MELLITUS WITH HYPERGLYCEMIA, WITHOUT LONG-TERM CURRENT USE OF INSULIN (HCC): ICD-10-CM

## 2025-03-10 DIAGNOSIS — R42 VERTIGO: ICD-10-CM

## 2025-03-10 PROCEDURE — 99396 PREV VISIT EST AGE 40-64: CPT | Performed by: STUDENT IN AN ORGANIZED HEALTH CARE EDUCATION/TRAINING PROGRAM

## 2025-03-10 PROCEDURE — 99214 OFFICE O/P EST MOD 30 MIN: CPT | Performed by: STUDENT IN AN ORGANIZED HEALTH CARE EDUCATION/TRAINING PROGRAM

## 2025-03-10 NOTE — ASSESSMENT & PLAN NOTE
Intermittent dizziness over the past month  States that started after last eye injection unsure if related to muscular strain in neck   Will refer to PT for neck pain as well as Vestibular therapy

## 2025-03-10 NOTE — ASSESSMENT & PLAN NOTE
Annual Physical Exam Completed  Patient utd on preventative screenings including Colon Cancer screening  Patient declines lung cancer screening at this time, due to radiation exposure.

## 2025-03-10 NOTE — ASSESSMENT & PLAN NOTE
Diet controlled  Diabetic Foot Exam Completed Today   Lab Results   Component Value Date    HGBA1C 5.5 02/07/2025

## 2025-03-10 NOTE — PROGRESS NOTES
Adult Annual Physical  Name: Alfonso An      : 1970      MRN: [id number]  Encounter Provider: Kanchan Mendoza DO  Encounter Date: 3/10/2025   Encounter department: [facility] PRIMARY CARE    Assessment & Plan  Annual physical exam  Annual Physical Exam Completed  Patient utd on preventative screenings including Colon Cancer screening  Patient declines lung cancer screening at this time, due to radiation exposure.       Non-Hodgkin's lymphoma, unspecified body region, unspecified non-Hodgkin lymphoma type (HCC)  Currently in remission        Hypopituitarism (HCC)  S/P radiation for lymphoma.  On chronic testosterone therapy       Type 2 diabetes mellitus with hyperglycemia, without long-term current use of insulin (HCC)  Diet controlled  Diabetic Foot Exam Completed Today   Lab Results   Component Value Date    HGBA1C 5.5 2025          Dizziness  Intermittent dizziness over the past month  States that started after last eye injection unsure if related to muscular strain in neck   Will refer to PT for neck pain as well as Vestibular therapy        Vertigo    Orders:  •  Ambulatory Referral to Physical Therapy; Future    Neck pain on left side    Orders:  •  Ambulatory Referral to Physical Therapy; Future      Immunizations and preventive care screenings were discussed with patient today. Appropriate education was printed on patient's after visit summary.        Counseling:  Alcohol/drug use: discussed moderation in alcohol intake, the recommendations for healthy alcohol use, and avoidance of illicit drug use.  Dental Health: discussed importance of regular tooth brushing, flossing, and dental visits.  Exercise: the importance of regular exercise/physical activity was discussed. Recommend exercise 3-5 times per week for at least 30 minutes.          History of Present Illness         Adult Annual Physical:  Patient presents for annual physical.     Diet and Physical Activity:  -  "Diet/Nutrition: well balanced diet.  - Exercise: walking.    General Health:  - Sleep: sleeps well.  - Hearing: normal hearing right ear.  - Vision: wears glasses.  - Dental: regular dental visits.    Review of Systems   Constitutional:  Negative for chills and fever.   Respiratory:  Negative for cough and shortness of breath.    Cardiovascular:  Negative for chest pain and palpitations.   Gastrointestinal:  Negative for abdominal pain, constipation, diarrhea, nausea and vomiting.   Musculoskeletal:  Positive for neck pain.   Neurological:  Positive for dizziness.         Objective   /74 (BP Location: Left arm, Patient Position: Sitting, Cuff Size: Standard)   Pulse 78   Resp 18   Ht 6' 1\" (1.854 m)   Wt 91.2 kg (201 lb)   SpO2 98%   BMI 26.52 kg/m²     Physical Exam  Vitals reviewed.   Constitutional:       Appearance: Normal appearance.   HENT:      Head: Normocephalic and atraumatic.   Eyes:      Extraocular Movements: Extraocular movements intact.   Cardiovascular:      Rate and Rhythm: Normal rate and regular rhythm.      Pulses: no weak pulses.           Dorsalis pedis pulses are 2+ on the right side and 2+ on the left side.        Posterior tibial pulses are 2+ on the right side and 2+ on the left side.      Heart sounds: Murmur heard.   Pulmonary:      Effort: Pulmonary effort is normal.      Breath sounds: Normal breath sounds.   Feet:      Right foot:      Skin integrity: No ulcer, skin breakdown, erythema, warmth, callus or dry skin.      Left foot:      Skin integrity: No ulcer, skin breakdown, erythema, warmth, callus or dry skin.   Neurological:      General: No focal deficit present.      Mental Status: He is alert and oriented to person, place, and time.   Psychiatric:         Mood and Affect: Mood normal.         Behavior: Behavior normal.         Diabetic Foot Exam    Patient's shoes and socks removed.    Right Foot/Ankle   Right Foot Inspection  Skin Exam: skin normal and skin intact. " No dry skin, no warmth, no callus, no erythema, no maceration, no abnormal color, no pre-ulcer, no ulcer and no callus.     Toe Exam: ROM and strength within normal limits.     Sensory   Proprioception: intact  Monofilament testing: intact    Vascular  The right DP pulse is 2+. The right PT pulse is 2+.     Left Foot/Ankle  Left Foot Inspection  Skin Exam: skin normal and skin intact. No dry skin, no warmth, no erythema, no maceration, normal color, no pre-ulcer, no ulcer and no callus.     Toe Exam: ROM and strength within normal limits.     Sensory   Proprioception: intact  Monofilament testing: intact    Vascular  The left DP pulse is 2+. The left PT pulse is 2+.     Assign Risk Category  No deformity present  No loss of protective sensation  No weak pulses  Risk: 0

## 2025-04-21 ENCOUNTER — TELEPHONE (OUTPATIENT)
Dept: FAMILY MEDICINE CLINIC | Facility: CLINIC | Age: 55
End: 2025-04-21

## 2025-04-21 NOTE — TELEPHONE ENCOUNTER
Left message with [place] of Disability Determination a statement needs to be given by the patient's Ophthalmologist not Primary Care Doctor.

## 2025-04-24 ENCOUNTER — TELEMEDICINE (OUTPATIENT)
Dept: ENDOCRINOLOGY | Facility: HOSPITAL | Age: 55
End: 2025-04-24
Payer: COMMERCIAL

## 2025-04-24 VITALS — WEIGHT: 205 LBS | BODY MASS INDEX: 27.17 KG/M2 | HEIGHT: 73 IN

## 2025-04-24 DIAGNOSIS — E78.2 MIXED HYPERLIPIDEMIA: ICD-10-CM

## 2025-04-24 DIAGNOSIS — E11.65 TYPE 2 DIABETES MELLITUS WITH HYPERGLYCEMIA, WITHOUT LONG-TERM CURRENT USE OF INSULIN (HCC): Primary | ICD-10-CM

## 2025-04-24 DIAGNOSIS — E34.9 TESTOSTERONE DEFICIENCY: ICD-10-CM

## 2025-04-24 DIAGNOSIS — E03.9 HYPOTHYROIDISM, UNSPECIFIED TYPE: ICD-10-CM

## 2025-04-24 DIAGNOSIS — E23.0 HYPOPITUITARISM (HCC): ICD-10-CM

## 2025-04-24 PROCEDURE — 99215 OFFICE O/P EST HI 40 MIN: CPT | Performed by: STUDENT IN AN ORGANIZED HEALTH CARE EDUCATION/TRAINING PROGRAM

## 2025-04-24 RX ORDER — TESTOSTERONE 1.62 MG/G
81 GEL TRANSDERMAL EVERY MORNING
Qty: 75 G | Refills: 1 | Status: SHIPPED | OUTPATIENT
Start: 2025-04-24

## 2025-04-24 RX ORDER — FLUOCINOLONE ACETONIDE 0.18 MG/1
IMPLANT INTRAVITREAL
COMMUNITY
Start: 2025-04-17

## 2025-04-24 NOTE — ASSESSMENT & PLAN NOTE
Orders:    testosterone (ANDROGEL) 1.62 % TD gel pump; Apply 4 actuation (81 mg total) topically every morning    T4, free; Future    TSH, 3rd generation; Future    Testosterone, free, total; Future    CBC and differential; Future    Comprehensive metabolic panel; Future    Lipid panel; Future

## 2025-04-24 NOTE — PROGRESS NOTES
Administrative Statements   Encounter provider Yvette Padilla MD    The Patient is located at Home and in the following state in which I hold an active license [place].    The patient was identified by name and date of birth. Alfonso An was informed that this is a telemedicine visit and that the visit is being conducted through the Epic Embedded platform. He agrees to proceed..  My office door was closed. No one else was in the room.  He acknowledged consent and understanding of privacy and security of the video platform. The patient has agreed to participate and understands they can discontinue the visit at any time.    I have spent a total time of 40 minutes in caring for this patient on the day of the visit/encounter including Patient and family education, Importance of tx compliance, Risk factor reductions, Impressions, Counseling / Coordination of care, Documenting in the medical record, and Obtaining or reviewing history  , not including the time spent for establishing the audio/video connection.       Name: Alfonso An      : 1970      MRN: [id number]  Encounter Provider: Yvette Padilla MD  Encounter Date: 2025   Encounter department: [facility] [employer]    No chief complaint on file.  :  Assessment & Plan  Testosterone deficiency    Orders:    testosterone (ANDROGEL) 1.62 % TD gel pump; Apply 4 actuation (81 mg total) topically every morning    T4, free; Future    TSH, 3rd generation; Future    Testosterone, free, total; Future    CBC and differential; Future    Comprehensive metabolic panel; Future    Lipid panel; Future    Patient is a 52yM with PMHx of hypothyroidism and secondary hypogonadism unclear etiology- ?radiation induced while tx of lymphoma, with other PMHx of t2DM, hyperlipidemia, class 2 obesity, NAFLD who presents today for follow up. Last visit      1) Hypothyroidism- given normal TSH and not low, discussed has primary hypothyroidism.  Currently thyroid based on labs and clinically.  C/w levothyroxine 150mcg daily, repeat labs in 3 months     2) Hypogonadism- given low LH/FSH concerned about secondary hypogonadism, most likely d/t iatrogenic testosterone use. Unfortunately since on T replacement for long time now, has secondary hypogonadism. MRI pit neg, genetic issues cannot be ruled out. Having issues with tolerance and fluctuation in symptoms on IM T, would like to try TD again. Was on 4 pumps daily in past and hence will switch back to this, androgel 81mg daily. Labs in 3 months      2) Diabetes:- Patients HbA1C is elevated at 9.7%, Pancreatic ca, FELIX and cushing ruled out. Most likely has T2DM at this time. C peptide normal. BG and HbA1C now improved to prediabetic range 5.9% with diet only currently.   C/w this and repeat labs in 3 months   Follows up with ophthalmology regularly  Uptodate on lipid/urine       3) Hyperlipidemia:- patient currently not on any medication, LDL and TG above goal, possible d/t testosterone effect. Ok to continue with milk thisle. Discussed considering red rice yeast he will think about this. Does not want statin/zetia. Discussed potentially consider CC score to look at cardiac risk as well d/t persistantly elevated LDL but deepak would like to defer on this for now     RTC in 3 months      I have spent a total time of 40 minutes on 12/21/23 in caring for this patient including Diagnostic results, Prognosis, Risks and benefits of tx options, Instructions for management, Patient and family education, Impressions and Obtaining or reviewing history     History of Present Illness     Alfonso An is a 54 y.o. male    Pertinent Medical History   Alfonso An is a 53 y.o. year old male with history of lymphoma in 1996 tx with radiation and chemotherapy- neck to pelvis who was then diagnosed with hypothyroidism in 2009 on levothyroxine and also hypogonadism on testosterone replacement. Other PMHx of ?hemachromatosis,  NAFLD, other PMHx of prediabetes, hyperlipidemia and class 1 obesity, and GH def who presents for follow up today      Hypogonadism:- previously on androgel with stable control for years but started to have low T levels  and hence switch to IM. Tried xyosted but could not tolerate it and hence back on cypionate. currently on IM testosterone 50mg every 1 weeks. Most recent testosterone 02/25 Total 305. Free was 7.0. reports feeling very tired at the end of his weekly cycle. Reports hates the ups and downs he feels with the shots and would like to try the gel again. Previously on androgel 4 pumps daily and switched to shots d/t levels being low.      Hypothyroidism:- patient currently on 150mcg levothyroxine daily, most recent TSH and Free t4 normal 02/25.       Diabetes:- patient reports previously diabetes was under control. Previously on metformin, reports was on keto diet and lost weight and diabetes was under control. Gained weight and diabetes decline since stopping keto with HbA1C of 9.7%, given rapid progression Pancreatic Ca ruled out with CT abdomen (did show fatty liver disease), cushing ruled out with normal ACTH and cortisol and FELIX also ruled out.   Medication- not taking metformin anymore. Reports fluctuates his diet with carnivore, regular and keto.   A1 5.5%  Eye:- has retinal dettachment and surgery 06/23, follows ophthalmology at [facility] closely. Gets eylea injections every month.   Last foot exam- 06/23  Diet- eating modified keto 1 week keto, 1 week keto-carni, 1 week carnivore     Hyperlipidemia:- elevated level in past and wanted to work on lifestyle changes. Last , Chol 302, . Patient taking milk thisle and fish oil. Does not want any other medications given hx of myalgia on statin/zetia. Did have Stress test in 2023 and was normal      Low GH:- Patient previously found to have low IGF-1 level 55 03/23 repeat, IGF 34 with GH 0.1. Glucagon stim testing done which was positive for  GH def.     Family hx and social hx as below       Review of Systems as per HPI    Medical History Reviewed by provider this encounter:     .    Objective   There were no vitals taken for this visit.     There is no height or weight on file to calculate BMI.  Wt Readings from Last 3 Encounters:   03/10/25 91.2 kg (201 lb)   11/20/24 93.2 kg (205 lb 6 oz)   07/24/24 93 kg (205 lb)     Physical Exam    Labs:    Latest Reference Range & Units 02/07/25 09:26   Sodium 134 - 144 mmol/L 141   Potassium 3.5 - 5.2 mmol/L 4.4   Chloride 96 - 106 mmol/L 105   Carbon Dioxide 20 - 29 mmol/L 20   BUN 6 - 24 mg/dL 16   Creatinine 0.76 - 1.27 mg/dL 0.84   SL AMB BUN/CREATININE RATIO 9 - 20  19   GLUCOSE 70 - 99 mg/dL 132 (H)   AST 0 - 40 IU/L 26   ALT 0 - 44 IU/L 21   Total Protein 6.0 - 8.5 g/dL 7.4   Albumin 3.8 - 4.9 g/dL 4.8   Total Bilirubin 0.0 - 1.2 mg/dL 0.3   GFR, Calculated >59 mL/min/1.73 104   Cholesterol 100 - 199 mg/dL 302 (H)   Triglycerides 0 - 149 mg/dL 136   HDL >39 mg/dL 44   LDL Calculated 0 - 99 mg/dL 233 (H)   VLDL Cholesterol Musa 5 - 40 mg/dL 25   (H): Data is abnormally high      There are no Patient Instructions on file for this visit.    Discussed with the patient and all questioned fully answered. He will call me if any problems arise.

## 2025-05-26 DIAGNOSIS — E78.2 MIXED HYPERLIPIDEMIA: ICD-10-CM

## 2025-05-26 DIAGNOSIS — E03.9 HYPOTHYROIDISM, UNSPECIFIED TYPE: ICD-10-CM

## 2025-05-26 DIAGNOSIS — E11.65 TYPE 2 DIABETES MELLITUS WITH HYPERGLYCEMIA, WITHOUT LONG-TERM CURRENT USE OF INSULIN (HCC): ICD-10-CM

## 2025-05-26 DIAGNOSIS — E34.9 TESTOSTERONE DEFICIENCY: ICD-10-CM

## 2025-05-26 DIAGNOSIS — E23.0 HYPOPITUITARISM (HCC): ICD-10-CM

## 2025-05-28 NOTE — TELEPHONE ENCOUNTER
Requested medication(s) are due for refill today: Yes  Patient has already received a courtesy refill: No  Other reason request has been forwarded to provider: Please review dose for a 90 day supply

## 2025-05-29 RX ORDER — TESTOSTERONE 1.62 MG/G
81 GEL TRANSDERMAL DAILY
Qty: 450 G | Refills: 0 | Status: SHIPPED | OUTPATIENT
Start: 2025-05-29

## 2025-06-11 ENCOUNTER — OFFICE VISIT (OUTPATIENT)
Dept: FAMILY MEDICINE CLINIC | Facility: CLINIC | Age: 55
End: 2025-06-11
Payer: COMMERCIAL

## 2025-06-11 VITALS
OXYGEN SATURATION: 98 % | HEIGHT: 73 IN | TEMPERATURE: 98 F | BODY MASS INDEX: 26.44 KG/M2 | HEART RATE: 81 BPM | SYSTOLIC BLOOD PRESSURE: 102 MMHG | WEIGHT: 199.5 LBS | DIASTOLIC BLOOD PRESSURE: 64 MMHG

## 2025-06-11 DIAGNOSIS — Z13.0 SCREENING FOR DEFICIENCY ANEMIA: ICD-10-CM

## 2025-06-11 DIAGNOSIS — E11.65 TYPE 2 DIABETES MELLITUS WITH HYPERGLYCEMIA, WITHOUT LONG-TERM CURRENT USE OF INSULIN (HCC): Primary | ICD-10-CM

## 2025-06-11 DIAGNOSIS — E55.9 VITAMIN D DEFICIENCY: ICD-10-CM

## 2025-06-11 DIAGNOSIS — F17.211 NICOTINE DEPENDENCE, CIGARETTES, IN REMISSION: ICD-10-CM

## 2025-06-11 DIAGNOSIS — E53.8 VITAMIN B12 DEFICIENCY: ICD-10-CM

## 2025-06-11 PROCEDURE — 99213 OFFICE O/P EST LOW 20 MIN: CPT | Performed by: STUDENT IN AN ORGANIZED HEALTH CARE EDUCATION/TRAINING PROGRAM

## 2025-06-11 NOTE — PROGRESS NOTES
"Name: Alfonso An      : 1970      MRN: [id number]  Encounter Provider: Kanchan Mendoza DO  Encounter Date: 2025   Encounter department: [facility]  :  Assessment & Plan  Nicotine dependence, cigarettes, in remission  Patient declines lung Cancer Screening at this time        Type 2 diabetes mellitus with hyperglycemia, without long-term current use of insulin (HCC)  Patient currently on carnivore diet, weight does steadily decrease.  Will order repeat A1c, patient does follow with endocrinology in which additional labs have been ordered.  Lab Results   Component Value Date    HGBA1C 5.5 2025       Orders:  •  Hemoglobin A1C; Future  •  Albumin / creatinine urine ratio; Future    Vitamin D deficiency  Vitamin D level pending  Orders:  •  Vitamin D 25 hydroxy; Future    Vitamin B12 deficiency  Vitamin B12 level pending.  Orders:  •  Vitamin B12; Future    Screening for deficiency anemia    Orders:  •  Iron Panel (Includes Ferritin, Iron Sat%, Iron, and TIBC); Future           History of Present Illness   Patient presents today for routine follow-up visit.      Review of Systems   Constitutional:  Negative for chills and fever.   HENT:  Negative for congestion and rhinorrhea.    Respiratory:  Negative for cough and shortness of breath.    Cardiovascular:  Negative for chest pain and palpitations.   Gastrointestinal:  Negative for abdominal pain.   Neurological:  Negative for dizziness and headaches.       Objective   /64 (BP Location: Left arm, Patient Position: Sitting, Cuff Size: Large)   Pulse 81   Temp 98 °F (36.7 °C) (Temporal)   Ht 6' 1\" (1.854 m)   Wt 90.5 kg (199 lb 8 oz)   SpO2 98%   BMI 26.32 kg/m²      Physical Exam  Vitals reviewed.   Constitutional:       Appearance: Normal appearance.   HENT:      Head: Normocephalic and atraumatic.      Mouth/Throat:      Mouth: Mucous membranes are moist.     Eyes:      Extraocular Movements: " Extraocular movements intact.       Cardiovascular:      Rate and Rhythm: Normal rate and regular rhythm.      Heart sounds: Normal heart sounds.   Pulmonary:      Effort: Pulmonary effort is normal.      Breath sounds: Normal breath sounds.     Neurological:      General: No focal deficit present.      Mental Status: He is alert and oriented to person, place, and time.     Psychiatric:         Mood and Affect: Mood normal.         Behavior: Behavior normal.

## 2025-06-11 NOTE — ASSESSMENT & PLAN NOTE
Patient currently on carnivore diet, weight does steadily decrease.  Will order repeat A1c, patient does follow with endocrinology in which additional labs have been ordered.  Lab Results   Component Value Date    HGBA1C 5.5 02/07/2025       Orders:  •  Hemoglobin A1C; Future  •  Albumin / creatinine urine ratio; Future

## 2025-07-12 LAB
25(OH)D3+25(OH)D2 SERPL-MCNC: 97.8 NG/ML (ref 30–100)
ALBUMIN SERPL-MCNC: 5 G/DL (ref 3.8–4.9)
ALBUMIN/CREAT UR: <3 MG/G CREAT (ref 0–29)
ALP SERPL-CCNC: 143 IU/L (ref 44–121)
ALT SERPL-CCNC: 28 IU/L (ref 0–44)
AST SERPL-CCNC: 30 IU/L (ref 0–40)
BASOPHILS # BLD AUTO: 0.1 X10E3/UL (ref 0–0.2)
BASOPHILS NFR BLD AUTO: 1 %
BILIRUB SERPL-MCNC: 0.2 MG/DL (ref 0–1.2)
BUN SERPL-MCNC: 17 MG/DL (ref 6–24)
BUN/CREAT SERPL: 23 (ref 9–20)
CALCIUM SERPL-MCNC: 10 MG/DL (ref 8.7–10.2)
CHLORIDE SERPL-SCNC: 101 MMOL/L (ref 96–106)
CHOLEST SERPL-MCNC: 327 MG/DL (ref 100–199)
CO2 SERPL-SCNC: 21 MMOL/L (ref 20–29)
CREAT SERPL-MCNC: 0.75 MG/DL (ref 0.76–1.27)
CREAT UR-MCNC: 94.1 MG/DL
EGFR: 108 ML/MIN/1.73
EOSINOPHIL # BLD AUTO: 0.1 X10E3/UL (ref 0–0.4)
EOSINOPHIL NFR BLD AUTO: 2 %
ERYTHROCYTE [DISTWIDTH] IN BLOOD BY AUTOMATED COUNT: 15 % (ref 11.6–15.4)
FERRITIN SERPL-MCNC: 1086 NG/ML (ref 30–400)
GLOBULIN SER-MCNC: 2.7 G/DL (ref 1.5–4.5)
GLUCOSE SERPL-MCNC: 101 MG/DL (ref 70–99)
HBA1C MFR BLD: 5.8 % (ref 4.8–5.6)
HCT VFR BLD AUTO: 48.5 % (ref 37.5–51)
HDLC SERPL-MCNC: 38 MG/DL
HGB BLD-MCNC: 16.2 G/DL (ref 13–17.7)
IMM GRANULOCYTES # BLD: 0 X10E3/UL (ref 0–0.1)
IMM GRANULOCYTES NFR BLD: 0 %
IRON SATN MFR SERPL: 29 % (ref 15–55)
IRON SERPL-MCNC: 90 UG/DL (ref 38–169)
LDL CALC COMMENT: ABNORMAL
LDLC SERPL CALC-MCNC: 229 MG/DL (ref 0–99)
LYMPHOCYTES # BLD AUTO: 2.1 X10E3/UL (ref 0.7–3.1)
LYMPHOCYTES NFR BLD AUTO: 28 %
MCH RBC QN AUTO: 32.3 PG (ref 26.6–33)
MCHC RBC AUTO-ENTMCNC: 33.4 G/DL (ref 31.5–35.7)
MCV RBC AUTO: 97 FL (ref 79–97)
MICROALBUMIN UR-MCNC: <3 UG/ML
MONOCYTES # BLD AUTO: 0.6 X10E3/UL (ref 0.1–0.9)
MONOCYTES NFR BLD AUTO: 8 %
NEUTROPHILS # BLD AUTO: 4.5 X10E3/UL (ref 1.4–7)
NEUTROPHILS NFR BLD AUTO: 61 %
PLATELET # BLD AUTO: 330 X10E3/UL (ref 150–450)
POTASSIUM SERPL-SCNC: 4.4 MMOL/L (ref 3.5–5.2)
PROT SERPL-MCNC: 7.7 G/DL (ref 6–8.5)
RBC # BLD AUTO: 5.01 X10E6/UL (ref 4.14–5.8)
SL AMB VLDL CHOLESTEROL CALC: 60 MG/DL (ref 5–40)
SODIUM SERPL-SCNC: 138 MMOL/L (ref 134–144)
T4 FREE SERPL-MCNC: 1.36 NG/DL (ref 0.82–1.77)
TESTOST FREE SERPL-MCNC: 8.4 PG/ML (ref 7.2–24)
TESTOST SERPL-MCNC: 737 NG/DL (ref 264–916)
TIBC SERPL-MCNC: 314 UG/DL (ref 250–450)
TRIGL SERPL-MCNC: 290 MG/DL (ref 0–149)
TSH SERPL DL<=0.005 MIU/L-ACNC: 1.68 UIU/ML (ref 0.45–4.5)
UIBC SERPL-MCNC: 224 UG/DL (ref 111–343)
VIT B12 SERPL-MCNC: 656 PG/ML (ref 232–1245)
WBC # BLD AUTO: 7.4 X10E3/UL (ref 3.4–10.8)

## 2025-08-06 DIAGNOSIS — E34.9 TESTOSTERONE DEFICIENCY: ICD-10-CM

## 2025-08-06 DIAGNOSIS — E23.0 HYPOPITUITARISM (HCC): ICD-10-CM

## 2025-08-06 DIAGNOSIS — E78.2 MIXED HYPERLIPIDEMIA: ICD-10-CM

## 2025-08-06 DIAGNOSIS — E03.9 HYPOTHYROIDISM, UNSPECIFIED TYPE: ICD-10-CM

## 2025-08-06 DIAGNOSIS — E11.65 TYPE 2 DIABETES MELLITUS WITH HYPERGLYCEMIA, WITHOUT LONG-TERM CURRENT USE OF INSULIN (HCC): ICD-10-CM

## 2025-08-07 RX ORDER — TESTOSTERONE 1.62 MG/G
GEL TRANSDERMAL
Qty: 225 G | Refills: 1 | Status: SHIPPED | OUTPATIENT
Start: 2025-08-07